# Patient Record
Sex: FEMALE | Race: WHITE | NOT HISPANIC OR LATINO
[De-identification: names, ages, dates, MRNs, and addresses within clinical notes are randomized per-mention and may not be internally consistent; named-entity substitution may affect disease eponyms.]

---

## 2018-11-20 ENCOUNTER — TRANSCRIPTION ENCOUNTER (OUTPATIENT)
Age: 78
End: 2018-11-20

## 2018-11-23 ENCOUNTER — EMERGENCY (EMERGENCY)
Facility: HOSPITAL | Age: 78
LOS: 0 days | Discharge: HOME | End: 2018-11-23
Attending: STUDENT IN AN ORGANIZED HEALTH CARE EDUCATION/TRAINING PROGRAM | Admitting: STUDENT IN AN ORGANIZED HEALTH CARE EDUCATION/TRAINING PROGRAM
Payer: MEDICARE

## 2018-11-23 VITALS
SYSTOLIC BLOOD PRESSURE: 132 MMHG | OXYGEN SATURATION: 99 % | TEMPERATURE: 98 F | RESPIRATION RATE: 17 BRPM | HEART RATE: 66 BPM | DIASTOLIC BLOOD PRESSURE: 74 MMHG

## 2018-11-23 VITALS
TEMPERATURE: 98 F | HEIGHT: 62 IN | HEART RATE: 72 BPM | DIASTOLIC BLOOD PRESSURE: 62 MMHG | RESPIRATION RATE: 18 BRPM | WEIGHT: 134.92 LBS | OXYGEN SATURATION: 99 % | SYSTOLIC BLOOD PRESSURE: 148 MMHG

## 2018-11-23 DIAGNOSIS — M54.41 LUMBAGO WITH SCIATICA, RIGHT SIDE: ICD-10-CM

## 2018-11-23 DIAGNOSIS — M54.5 LOW BACK PAIN: ICD-10-CM

## 2018-11-23 LAB
ANION GAP SERPL CALC-SCNC: 9 MMOL/L — SIGNIFICANT CHANGE UP (ref 5–17)
BASOPHILS # BLD AUTO: 0.04 K/UL — SIGNIFICANT CHANGE UP (ref 0–0.2)
BASOPHILS NFR BLD AUTO: 0.5 % — SIGNIFICANT CHANGE UP (ref 0–2)
BUN SERPL-MCNC: 24 MG/DL — HIGH (ref 7–23)
CALCIUM SERPL-MCNC: 8.9 MG/DL — SIGNIFICANT CHANGE UP (ref 8.5–10.1)
CHLORIDE SERPL-SCNC: 107 MMOL/L — SIGNIFICANT CHANGE UP (ref 96–108)
CO2 SERPL-SCNC: 24 MMOL/L — SIGNIFICANT CHANGE UP (ref 22–31)
CREAT SERPL-MCNC: 0.73 MG/DL — SIGNIFICANT CHANGE UP (ref 0.5–1.3)
EOSINOPHIL # BLD AUTO: 0.13 K/UL — SIGNIFICANT CHANGE UP (ref 0–0.5)
EOSINOPHIL NFR BLD AUTO: 1.6 % — SIGNIFICANT CHANGE UP (ref 0–6)
GLUCOSE SERPL-MCNC: 98 MG/DL — SIGNIFICANT CHANGE UP (ref 70–99)
HCT VFR BLD CALC: 35.9 % — SIGNIFICANT CHANGE UP (ref 34.5–45)
HGB BLD-MCNC: 11.9 G/DL — SIGNIFICANT CHANGE UP (ref 11.5–15.5)
IMM GRANULOCYTES NFR BLD AUTO: 0.2 % — SIGNIFICANT CHANGE UP (ref 0–1.5)
LYMPHOCYTES # BLD AUTO: 1.36 K/UL — SIGNIFICANT CHANGE UP (ref 1–3.3)
LYMPHOCYTES # BLD AUTO: 16.6 % — SIGNIFICANT CHANGE UP (ref 13–44)
MCHC RBC-ENTMCNC: 29 PG — SIGNIFICANT CHANGE UP (ref 27–34)
MCHC RBC-ENTMCNC: 33.1 GM/DL — SIGNIFICANT CHANGE UP (ref 32–36)
MCV RBC AUTO: 87.6 FL — SIGNIFICANT CHANGE UP (ref 80–100)
MONOCYTES # BLD AUTO: 0.25 K/UL — SIGNIFICANT CHANGE UP (ref 0–0.9)
MONOCYTES NFR BLD AUTO: 3 % — SIGNIFICANT CHANGE UP (ref 2–14)
NEUTROPHILS # BLD AUTO: 6.41 K/UL — SIGNIFICANT CHANGE UP (ref 1.8–7.4)
NEUTROPHILS NFR BLD AUTO: 78.1 % — HIGH (ref 43–77)
NRBC # BLD: 0 /100 WBCS — SIGNIFICANT CHANGE UP (ref 0–0)
PLATELET # BLD AUTO: 173 K/UL — SIGNIFICANT CHANGE UP (ref 150–400)
POTASSIUM SERPL-MCNC: 3.7 MMOL/L — SIGNIFICANT CHANGE UP (ref 3.5–5.3)
POTASSIUM SERPL-SCNC: 3.7 MMOL/L — SIGNIFICANT CHANGE UP (ref 3.5–5.3)
RBC # BLD: 4.1 M/UL — SIGNIFICANT CHANGE UP (ref 3.8–5.2)
RBC # FLD: 13 % — SIGNIFICANT CHANGE UP (ref 10.3–14.5)
SODIUM SERPL-SCNC: 140 MMOL/L — SIGNIFICANT CHANGE UP (ref 135–145)
WBC # BLD: 8.21 K/UL — SIGNIFICANT CHANGE UP (ref 3.8–10.5)
WBC # FLD AUTO: 8.21 K/UL — SIGNIFICANT CHANGE UP (ref 3.8–10.5)

## 2018-11-23 PROCEDURE — 99284 EMERGENCY DEPT VISIT MOD MDM: CPT

## 2018-11-23 RX ORDER — CYCLOBENZAPRINE HYDROCHLORIDE 10 MG/1
1 TABLET, FILM COATED ORAL
Qty: 21 | Refills: 0
Start: 2018-11-23 | End: 2018-11-29

## 2018-11-23 RX ORDER — KETOROLAC TROMETHAMINE 30 MG/ML
30 SYRINGE (ML) INJECTION ONCE
Qty: 0 | Refills: 0 | Status: DISCONTINUED | OUTPATIENT
Start: 2018-11-23 | End: 2018-11-23

## 2018-11-23 RX ORDER — OXYCODONE AND ACETAMINOPHEN 5; 325 MG/1; MG/1
1 TABLET ORAL
Qty: 12 | Refills: 0
Start: 2018-11-23 | End: 2018-11-25

## 2018-11-23 RX ORDER — DEXAMETHASONE 0.5 MG/5ML
10 ELIXIR ORAL ONCE
Qty: 0 | Refills: 0 | Status: COMPLETED | OUTPATIENT
Start: 2018-11-23 | End: 2018-11-23

## 2018-11-23 RX ORDER — MORPHINE SULFATE 50 MG/1
4 CAPSULE, EXTENDED RELEASE ORAL ONCE
Qty: 0 | Refills: 0 | Status: DISCONTINUED | OUTPATIENT
Start: 2018-11-23 | End: 2018-11-23

## 2018-11-23 RX ORDER — DIAZEPAM 5 MG
5 TABLET ORAL ONCE
Qty: 0 | Refills: 0 | Status: DISCONTINUED | OUTPATIENT
Start: 2018-11-23 | End: 2018-11-23

## 2018-11-23 RX ORDER — OXYCODONE AND ACETAMINOPHEN 5; 325 MG/1; MG/1
1 TABLET ORAL ONCE
Qty: 0 | Refills: 0 | Status: DISCONTINUED | OUTPATIENT
Start: 2018-11-23 | End: 2018-11-23

## 2018-11-23 RX ORDER — LIDOCAINE 4 G/100G
1 CREAM TOPICAL ONCE
Qty: 0 | Refills: 0 | Status: COMPLETED | OUTPATIENT
Start: 2018-11-23 | End: 2018-11-23

## 2018-11-23 RX ADMIN — MORPHINE SULFATE 4 MILLIGRAM(S): 50 CAPSULE, EXTENDED RELEASE ORAL at 17:52

## 2018-11-23 RX ADMIN — LIDOCAINE 1 PATCH: 4 CREAM TOPICAL at 17:52

## 2018-11-23 RX ADMIN — MORPHINE SULFATE 4 MILLIGRAM(S): 50 CAPSULE, EXTENDED RELEASE ORAL at 17:34

## 2018-11-23 RX ADMIN — LIDOCAINE 1 PATCH: 4 CREAM TOPICAL at 17:34

## 2018-11-23 RX ADMIN — Medication 10 MILLIGRAM(S): at 15:07

## 2018-11-23 RX ADMIN — OXYCODONE AND ACETAMINOPHEN 1 TABLET(S): 5; 325 TABLET ORAL at 17:07

## 2018-11-23 RX ADMIN — OXYCODONE AND ACETAMINOPHEN 1 TABLET(S): 5; 325 TABLET ORAL at 15:54

## 2018-11-23 RX ADMIN — Medication 5 MILLIGRAM(S): at 15:07

## 2018-11-23 RX ADMIN — Medication 30 MILLIGRAM(S): at 15:40

## 2018-11-23 RX ADMIN — Medication 30 MILLIGRAM(S): at 15:07

## 2018-11-23 NOTE — ED ADULT TRIAGE NOTE - CHIEF COMPLAINT QUOTE
Patient presents with back pain, states she twisted in the shower a few weeks back and felt pain, went to urgent care and received tramadol which did not help. Difficulty walking

## 2018-11-23 NOTE — ED PROVIDER NOTE - OBJECTIVE STATEMENT
77 y/o female with no relevant PMHx presents to the ED c/o right lower back pain x1 week. Initial onset of pain was when she twisted in the shower and has been constant since. Pt seen at urgent care and had x-rays showing no fracture, but + arthritis. Yesterday pt's back pain worsened and is now unable to ambulate due to pain. Back pain radiates down RLE. Taking Ibuprofen, Flexeril and lidocaine patch without relief.  No numbness, tingling, incontinence, abd pain. 79 y/o female with no relevant PMHx presents to the ED c/o right lower back pain x1 week. Initial onset of pain was when she twisted in the shower and has been constant since. Pt seen at urgent care and had x-rays showing no fracture, but + arthritis. Yesterday pt's back pain worsened and now with worsening pain and difficulty ambulating;  Back pain radiates down RLE. Taking Ibuprofen, Flexeril and lidocaine patch without relief.  No numbness, tingling, incontinence, abd pain.

## 2018-11-23 NOTE — ED PROVIDER NOTE - PROGRESS NOTE DETAILS
79 y/o F presents with low back pain x 2 weeks. Pt reports R sided low back pain moderate in intensity radiating down her R leg that started while picking up soap in the shower. pt states pain was starting to improve but she turned abruptly yesterday reinjuring her back. Minimal relief with ibuprofen at home.   back: No rashes. No midline tenderness. TTP to R SI area. +R SLR. 2+ DTRs. 5/5 LE muscle str.  -Claudy Gold PA-C Pt reports improvement of pain, able to amubulate. Will dc with pain control, SW given. Discussed importance of close FU with PMD.  Pt asked to return to ED immediately for any new or concerning sx or worsening sx. Pt acknowledges and understands plan. -Claudy Gold PA-C Charlie DO: Offered admission prior to discharge as patient with ?still pain but feels better at this time and wanting to go home; no red flags; able to ambulate with cane; f/u with pmd/spine in 1-2 days without fail; strict return precautions given.

## 2018-11-23 NOTE — ED ADULT NURSE NOTE - OBJECTIVE STATEMENT
Pt is a 78y female, A & O x 3, VSS, BIBA, states worsening back pain and difficulty ambulating x 1.5 weeks, was seen at urgent care, given several medications with no relief, yesterday pt states she further aggravated her back standing up and heard a "pop" sound, pain radiates down right leg. + pulse, motor & sensation.

## 2018-11-23 NOTE — ED PROVIDER NOTE - MUSCULOSKELETAL, MLM
Spine appears normal, range of motion is not limited. +right sided paravertebral tenderness. No midline tenderness.

## 2018-11-23 NOTE — ED PROVIDER NOTE - ATTENDING CONTRIBUTION TO CARE
I, Rebeka Guerra DO,  performed the initial face to face bedside interview with this patient regarding history of present illness, review of symptoms and relevant past medical, social and family history.  I completed an independent physical examination.  I was the initial provider who evaluated this patient. I have signed out the follow up of any pending tests (i.e. labs, radiological studies) to the ACP.  I have communicated the patient’s plan of care and disposition with the ACP.  The history, relevant review of systems, past medical and surgical history, medical decision making, and physical examination was documented by the scribe in my presence and I attest to the accuracy of the documentation.

## 2020-11-27 ENCOUNTER — OUTPATIENT (OUTPATIENT)
Dept: OUTPATIENT SERVICES | Facility: HOSPITAL | Age: 80
LOS: 1 days | End: 2020-11-27
Payer: MEDICARE

## 2020-11-27 DIAGNOSIS — Z11.59 ENCOUNTER FOR SCREENING FOR OTHER VIRAL DISEASES: ICD-10-CM

## 2020-11-27 LAB — SARS-COV-2 RNA SPEC QL NAA+PROBE: SIGNIFICANT CHANGE UP

## 2020-11-27 PROCEDURE — U0003: CPT

## 2020-11-28 DIAGNOSIS — Z11.59 ENCOUNTER FOR SCREENING FOR OTHER VIRAL DISEASES: ICD-10-CM

## 2021-02-17 NOTE — ED ADULT NURSE NOTE - NS ED NURSE DC INFO COMPLEXITY
Patient scheduled for excision of mass on lateral right arm on 2/12/21 with Dr. Shukla.  Precert authorized by [facility], authorization # [id number] by Gigi Hendricks from 2/8/21 - 2/12/21   Simple: Patient demonstrates quick and easy understanding/Returned Demonstration/Verbalized Understanding

## 2023-07-12 ENCOUNTER — INPATIENT (INPATIENT)
Facility: HOSPITAL | Age: 83
LOS: 2 days | Discharge: HOME | DRG: 286 | End: 2023-07-15
Attending: INTERNAL MEDICINE | Admitting: INTERNAL MEDICINE
Payer: MEDICARE

## 2023-07-12 VITALS
DIASTOLIC BLOOD PRESSURE: 69 MMHG | TEMPERATURE: 98 F | WEIGHT: 128.09 LBS | HEART RATE: 87 BPM | OXYGEN SATURATION: 93 % | RESPIRATION RATE: 18 BRPM | SYSTOLIC BLOOD PRESSURE: 143 MMHG

## 2023-07-12 DIAGNOSIS — I44.7 LEFT BUNDLE-BRANCH BLOCK, UNSPECIFIED: ICD-10-CM

## 2023-07-12 DIAGNOSIS — I45.81 LONG QT SYNDROME: ICD-10-CM

## 2023-07-12 DIAGNOSIS — I25.118 ATHEROSCLEROTIC HEART DISEASE OF NATIVE CORONARY ARTERY WITH OTHER FORMS OF ANGINA PECTORIS: ICD-10-CM

## 2023-07-12 DIAGNOSIS — I24.8 OTHER FORMS OF ACUTE ISCHEMIC HEART DISEASE: ICD-10-CM

## 2023-07-12 DIAGNOSIS — Z86.16 PERSONAL HISTORY OF COVID-19: ICD-10-CM

## 2023-07-12 DIAGNOSIS — E78.5 HYPERLIPIDEMIA, UNSPECIFIED: ICD-10-CM

## 2023-07-12 DIAGNOSIS — E88.09 OTHER DISORDERS OF PLASMA-PROTEIN METABOLISM, NOT ELSEWHERE CLASSIFIED: ICD-10-CM

## 2023-07-12 DIAGNOSIS — I65.21 OCCLUSION AND STENOSIS OF RIGHT CAROTID ARTERY: ICD-10-CM

## 2023-07-12 DIAGNOSIS — G47.00 INSOMNIA, UNSPECIFIED: ICD-10-CM

## 2023-07-12 DIAGNOSIS — Z90.49 ACQUIRED ABSENCE OF OTHER SPECIFIED PARTS OF DIGESTIVE TRACT: ICD-10-CM

## 2023-07-12 DIAGNOSIS — E43 UNSPECIFIED SEVERE PROTEIN-CALORIE MALNUTRITION: ICD-10-CM

## 2023-07-12 DIAGNOSIS — I50.23 ACUTE ON CHRONIC SYSTOLIC (CONGESTIVE) HEART FAILURE: ICD-10-CM

## 2023-07-12 DIAGNOSIS — R06.02 SHORTNESS OF BREATH: ICD-10-CM

## 2023-07-12 DIAGNOSIS — E87.6 HYPOKALEMIA: ICD-10-CM

## 2023-07-12 DIAGNOSIS — I42.8 OTHER CARDIOMYOPATHIES: ICD-10-CM

## 2023-07-12 DIAGNOSIS — I11.0 HYPERTENSIVE HEART DISEASE WITH HEART FAILURE: ICD-10-CM

## 2023-07-12 DIAGNOSIS — R91.8 OTHER NONSPECIFIC ABNORMAL FINDING OF LUNG FIELD: ICD-10-CM

## 2023-07-12 LAB
ALBUMIN SERPL ELPH-MCNC: 3 G/DL — LOW (ref 3.3–5)
ALP SERPL-CCNC: 102 U/L — SIGNIFICANT CHANGE UP (ref 40–120)
ALT FLD-CCNC: 12 U/L — SIGNIFICANT CHANGE UP (ref 12–78)
ANION GAP SERPL CALC-SCNC: 4 MMOL/L — LOW (ref 5–17)
APTT BLD: 28.2 SEC — SIGNIFICANT CHANGE UP (ref 27.5–35.5)
AST SERPL-CCNC: 8 U/L — LOW (ref 15–37)
BASOPHILS # BLD AUTO: 0.03 K/UL — SIGNIFICANT CHANGE UP (ref 0–0.2)
BASOPHILS NFR BLD AUTO: 0.4 % — SIGNIFICANT CHANGE UP (ref 0–2)
BILIRUB SERPL-MCNC: 0.5 MG/DL — SIGNIFICANT CHANGE UP (ref 0.2–1.2)
BUN SERPL-MCNC: 15 MG/DL — SIGNIFICANT CHANGE UP (ref 7–23)
CALCIUM SERPL-MCNC: 8.8 MG/DL — SIGNIFICANT CHANGE UP (ref 8.5–10.1)
CHLORIDE SERPL-SCNC: 113 MMOL/L — HIGH (ref 96–108)
CO2 SERPL-SCNC: 29 MMOL/L — SIGNIFICANT CHANGE UP (ref 22–31)
CREAT SERPL-MCNC: 0.56 MG/DL — SIGNIFICANT CHANGE UP (ref 0.5–1.3)
D DIMER BLD IA.RAPID-MCNC: 239 NG/ML DDU — HIGH
EGFR: 90 ML/MIN/1.73M2 — SIGNIFICANT CHANGE UP
EOSINOPHIL # BLD AUTO: 0.19 K/UL — SIGNIFICANT CHANGE UP (ref 0–0.5)
EOSINOPHIL NFR BLD AUTO: 2.4 % — SIGNIFICANT CHANGE UP (ref 0–6)
GLUCOSE SERPL-MCNC: 99 MG/DL — SIGNIFICANT CHANGE UP (ref 70–99)
HCT VFR BLD CALC: 34.9 % — SIGNIFICANT CHANGE UP (ref 34.5–45)
HGB BLD-MCNC: 11.5 G/DL — SIGNIFICANT CHANGE UP (ref 11.5–15.5)
IMM GRANULOCYTES NFR BLD AUTO: 0.2 % — SIGNIFICANT CHANGE UP (ref 0–0.9)
INR BLD: 1.1 RATIO — SIGNIFICANT CHANGE UP (ref 0.88–1.16)
LYMPHOCYTES # BLD AUTO: 2.02 K/UL — SIGNIFICANT CHANGE UP (ref 1–3.3)
LYMPHOCYTES # BLD AUTO: 25.1 % — SIGNIFICANT CHANGE UP (ref 13–44)
MCHC RBC-ENTMCNC: 29.2 PG — SIGNIFICANT CHANGE UP (ref 27–34)
MCHC RBC-ENTMCNC: 33 GM/DL — SIGNIFICANT CHANGE UP (ref 32–36)
MCV RBC AUTO: 88.6 FL — SIGNIFICANT CHANGE UP (ref 80–100)
MONOCYTES # BLD AUTO: 0.63 K/UL — SIGNIFICANT CHANGE UP (ref 0–0.9)
MONOCYTES NFR BLD AUTO: 7.8 % — SIGNIFICANT CHANGE UP (ref 2–14)
NEUTROPHILS # BLD AUTO: 5.15 K/UL — SIGNIFICANT CHANGE UP (ref 1.8–7.4)
NEUTROPHILS NFR BLD AUTO: 64.1 % — SIGNIFICANT CHANGE UP (ref 43–77)
NT-PROBNP SERPL-SCNC: 3224 PG/ML — HIGH (ref 0–450)
PLATELET # BLD AUTO: 137 K/UL — LOW (ref 150–400)
POTASSIUM SERPL-MCNC: 3.4 MMOL/L — LOW (ref 3.5–5.3)
POTASSIUM SERPL-SCNC: 3.4 MMOL/L — LOW (ref 3.5–5.3)
PROT SERPL-MCNC: 6.1 GM/DL — SIGNIFICANT CHANGE UP (ref 6–8.3)
PROTHROM AB SERPL-ACNC: 12.8 SEC — SIGNIFICANT CHANGE UP (ref 10.5–13.4)
RBC # BLD: 3.94 M/UL — SIGNIFICANT CHANGE UP (ref 3.8–5.2)
RBC # FLD: 13.1 % — SIGNIFICANT CHANGE UP (ref 10.3–14.5)
SODIUM SERPL-SCNC: 146 MMOL/L — HIGH (ref 135–145)
TROPONIN I, HIGH SENSITIVITY RESULT: 23.39 NG/L — SIGNIFICANT CHANGE UP
TROPONIN I, HIGH SENSITIVITY RESULT: 83.83 NG/L — HIGH
WBC # BLD: 8.04 K/UL — SIGNIFICANT CHANGE UP (ref 3.8–10.5)
WBC # FLD AUTO: 8.04 K/UL — SIGNIFICANT CHANGE UP (ref 3.8–10.5)

## 2023-07-12 PROCEDURE — C1894: CPT

## 2023-07-12 PROCEDURE — 99285 EMERGENCY DEPT VISIT HI MDM: CPT

## 2023-07-12 PROCEDURE — 84484 ASSAY OF TROPONIN QUANT: CPT

## 2023-07-12 PROCEDURE — 97116 GAIT TRAINING THERAPY: CPT | Mod: GP

## 2023-07-12 PROCEDURE — 71275 CT ANGIOGRAPHY CHEST: CPT | Mod: 26,MA

## 2023-07-12 PROCEDURE — C1769: CPT

## 2023-07-12 PROCEDURE — 71045 X-RAY EXAM CHEST 1 VIEW: CPT | Mod: 26

## 2023-07-12 PROCEDURE — 93306 TTE W/DOPPLER COMPLETE: CPT

## 2023-07-12 PROCEDURE — 85025 COMPLETE CBC W/AUTO DIFF WBC: CPT

## 2023-07-12 PROCEDURE — 36415 COLL VENOUS BLD VENIPUNCTURE: CPT

## 2023-07-12 PROCEDURE — 80061 LIPID PANEL: CPT

## 2023-07-12 PROCEDURE — 80048 BASIC METABOLIC PNL TOTAL CA: CPT

## 2023-07-12 PROCEDURE — 93005 ELECTROCARDIOGRAM TRACING: CPT

## 2023-07-12 PROCEDURE — 85610 PROTHROMBIN TIME: CPT

## 2023-07-12 PROCEDURE — 83735 ASSAY OF MAGNESIUM: CPT

## 2023-07-12 PROCEDURE — C1887: CPT

## 2023-07-12 PROCEDURE — 84100 ASSAY OF PHOSPHORUS: CPT

## 2023-07-12 PROCEDURE — 97162 PT EVAL MOD COMPLEX 30 MIN: CPT | Mod: GP

## 2023-07-12 PROCEDURE — 85027 COMPLETE CBC AUTOMATED: CPT

## 2023-07-12 PROCEDURE — 93880 EXTRACRANIAL BILAT STUDY: CPT

## 2023-07-12 PROCEDURE — 93454 CORONARY ARTERY ANGIO S&I: CPT

## 2023-07-12 PROCEDURE — 93010 ELECTROCARDIOGRAM REPORT: CPT

## 2023-07-12 RX ORDER — VITAMIN E 100 UNIT
1 CAPSULE ORAL
Refills: 0 | DISCHARGE

## 2023-07-12 RX ORDER — CHOLECALCIFEROL (VITAMIN D3) 125 MCG
1 CAPSULE ORAL
Refills: 0 | DISCHARGE

## 2023-07-12 RX ORDER — PREGABALIN 225 MG/1
1 CAPSULE ORAL
Refills: 0 | DISCHARGE

## 2023-07-12 RX ORDER — FUROSEMIDE 40 MG
20 TABLET ORAL DAILY
Refills: 0 | Status: DISCONTINUED | OUTPATIENT
Start: 2023-07-12 | End: 2023-07-13

## 2023-07-12 RX ADMIN — Medication 20 MILLIGRAM(S): at 20:10

## 2023-07-12 NOTE — ED ADULT NURSE NOTE - NSFALLHARMRISKINTERV_ED_ALL_ED

## 2023-07-12 NOTE — ED PROVIDER NOTE - OBJECTIVE STATEMENT
82 y/o w/ PMHx of cardiomyopathies and leaky valves BIBEMS from home, c/o shortness of breath and chest pressure after being in the pool today. Pt left the pool to use the bathroom when she reported SOB that progressively was getting worth. States she was not exerting herself but that it felt like gas pains. Symptoms have improved but still present, persistent dry cough. Denies CP, fevers, or palpitations. 84 y/o w/ PMHx of cardiomyopathy; ?leaky valve; BIBEMS from home, c/o shortness of breath after being in the pool today. Pt left the pool to use the bathroom when she reported SOB that progressively was getting worth. States she was not exerting herself; Symptoms have improved but still present, persistent dry cough. Denies CP, fevers, or palpitations.

## 2023-07-12 NOTE — ED PROVIDER NOTE - CLINICAL SUMMARY MEDICAL DECISION MAKING FREE TEXT BOX
pt w/ SOB episode, screening EKG, CXR, labs, R/E 82 y/o pt w/ SOB episode, screening EKG, CXR, labs, R/E 82 y/o pt w/ SOB episode, screening EKG, CXR, labs, Re-eval    CTA with evidence of pulmonary edema; lasix ordered; endorsed to Dr. Brar for admission. 84 y/o pt w/ SOB episode, screening EKG, CXR, labs, Re-eval;     EKG with new LBBB- no chest pain in ED; old reviewed from doctor's office and placed in chart; will admit for further work up.     CTA with evidence of pulmonary edema; lasix ordered; endorsed to Dr. Brar for admission.

## 2023-07-12 NOTE — ED ADULT TRIAGE NOTE - CHIEF COMPLAINT QUOTE
Pt BIBEMS from home, c/o shortness of breath and chest pressure after being in the pool today. States she was not exerting herself but that it felt like gas pains. Symptoms have improved but still present. SpO2 93% on RA in triage. HX of cardiomyopathies and leaky valves. Denies fevers. STAT EKG to be completed.

## 2023-07-12 NOTE — PHARMACOTHERAPY INTERVENTION NOTE - COMMENTS
Medication reconciliation completed.  Reviewed Medication list and confirmed med allergies with patient; confirmed with Dr. First Mederich.

## 2023-07-12 NOTE — ED ADULT NURSE NOTE - SKIN CAPILLARY REFILL
Called and gave results of OPO reviewed this morning scanned into media done on room air and looks good.  Order written to preferred home to discontinue oxygen and apnea monitor.  PRICE  
2 seconds or less

## 2023-07-13 DIAGNOSIS — Z98.49 CATARACT EXTRACTION STATUS, UNSPECIFIED EYE: Chronic | ICD-10-CM

## 2023-07-13 DIAGNOSIS — Z98.890 OTHER SPECIFIED POSTPROCEDURAL STATES: Chronic | ICD-10-CM

## 2023-07-13 DIAGNOSIS — Z90.49 ACQUIRED ABSENCE OF OTHER SPECIFIED PARTS OF DIGESTIVE TRACT: Chronic | ICD-10-CM

## 2023-07-13 LAB
ADD ON TEST-SPECIMEN IN LAB: SIGNIFICANT CHANGE UP
ANION GAP SERPL CALC-SCNC: 4 MMOL/L — LOW (ref 5–17)
BASOPHILS # BLD AUTO: 0.04 K/UL — SIGNIFICANT CHANGE UP (ref 0–0.2)
BASOPHILS NFR BLD AUTO: 0.6 % — SIGNIFICANT CHANGE UP (ref 0–2)
BUN SERPL-MCNC: 14 MG/DL — SIGNIFICANT CHANGE UP (ref 7–23)
CALCIUM SERPL-MCNC: 9.1 MG/DL — SIGNIFICANT CHANGE UP (ref 8.5–10.1)
CHLORIDE SERPL-SCNC: 110 MMOL/L — HIGH (ref 96–108)
CHOLEST SERPL-MCNC: 112 MG/DL — SIGNIFICANT CHANGE UP
CO2 SERPL-SCNC: 33 MMOL/L — HIGH (ref 22–31)
CREAT SERPL-MCNC: 0.61 MG/DL — SIGNIFICANT CHANGE UP (ref 0.5–1.3)
EGFR: 88 ML/MIN/1.73M2 — SIGNIFICANT CHANGE UP
EOSINOPHIL # BLD AUTO: 0.12 K/UL — SIGNIFICANT CHANGE UP (ref 0–0.5)
EOSINOPHIL NFR BLD AUTO: 1.8 % — SIGNIFICANT CHANGE UP (ref 0–6)
GLUCOSE SERPL-MCNC: 96 MG/DL — SIGNIFICANT CHANGE UP (ref 70–99)
HCT VFR BLD CALC: 36.8 % — SIGNIFICANT CHANGE UP (ref 34.5–45)
HDLC SERPL-MCNC: 45 MG/DL — LOW
HGB BLD-MCNC: 12.1 G/DL — SIGNIFICANT CHANGE UP (ref 11.5–15.5)
IMM GRANULOCYTES NFR BLD AUTO: 0.3 % — SIGNIFICANT CHANGE UP (ref 0–0.9)
INR BLD: 1.1 RATIO — SIGNIFICANT CHANGE UP (ref 0.88–1.16)
LIPID PNL WITH DIRECT LDL SERPL: 49 MG/DL — SIGNIFICANT CHANGE UP
LYMPHOCYTES # BLD AUTO: 2.02 K/UL — SIGNIFICANT CHANGE UP (ref 1–3.3)
LYMPHOCYTES # BLD AUTO: 29.9 % — SIGNIFICANT CHANGE UP (ref 13–44)
MAGNESIUM SERPL-MCNC: 2 MG/DL — SIGNIFICANT CHANGE UP (ref 1.6–2.6)
MCHC RBC-ENTMCNC: 29 PG — SIGNIFICANT CHANGE UP (ref 27–34)
MCHC RBC-ENTMCNC: 32.9 GM/DL — SIGNIFICANT CHANGE UP (ref 32–36)
MCV RBC AUTO: 88.2 FL — SIGNIFICANT CHANGE UP (ref 80–100)
MONOCYTES # BLD AUTO: 0.68 K/UL — SIGNIFICANT CHANGE UP (ref 0–0.9)
MONOCYTES NFR BLD AUTO: 10.1 % — SIGNIFICANT CHANGE UP (ref 2–14)
NEUTROPHILS # BLD AUTO: 3.87 K/UL — SIGNIFICANT CHANGE UP (ref 1.8–7.4)
NEUTROPHILS NFR BLD AUTO: 57.3 % — SIGNIFICANT CHANGE UP (ref 43–77)
NON HDL CHOLESTEROL: 67 MG/DL — SIGNIFICANT CHANGE UP
PHOSPHATE SERPL-MCNC: 3.3 MG/DL — SIGNIFICANT CHANGE UP (ref 2.5–4.5)
PLATELET # BLD AUTO: 129 K/UL — LOW (ref 150–400)
POTASSIUM SERPL-MCNC: 3.4 MMOL/L — LOW (ref 3.5–5.3)
POTASSIUM SERPL-SCNC: 3.4 MMOL/L — LOW (ref 3.5–5.3)
PROTHROM AB SERPL-ACNC: 12.8 SEC — SIGNIFICANT CHANGE UP (ref 10.5–13.4)
RBC # BLD: 4.17 M/UL — SIGNIFICANT CHANGE UP (ref 3.8–5.2)
RBC # FLD: 13.2 % — SIGNIFICANT CHANGE UP (ref 10.3–14.5)
SODIUM SERPL-SCNC: 147 MMOL/L — HIGH (ref 135–145)
TRIGL SERPL-MCNC: 97 MG/DL — SIGNIFICANT CHANGE UP
TROPONIN I, HIGH SENSITIVITY RESULT: 61.36 NG/L — HIGH
WBC # BLD: 6.75 K/UL — SIGNIFICANT CHANGE UP (ref 3.8–10.5)
WBC # FLD AUTO: 6.75 K/UL — SIGNIFICANT CHANGE UP (ref 3.8–10.5)

## 2023-07-13 PROCEDURE — 99497 ADVNCD CARE PLAN 30 MIN: CPT | Mod: 25

## 2023-07-13 PROCEDURE — 99223 1ST HOSP IP/OBS HIGH 75: CPT

## 2023-07-13 PROCEDURE — 12345: CPT | Mod: NC

## 2023-07-13 PROCEDURE — 93010 ELECTROCARDIOGRAM REPORT: CPT | Mod: 76

## 2023-07-13 PROCEDURE — 93880 EXTRACRANIAL BILAT STUDY: CPT | Mod: 26

## 2023-07-13 PROCEDURE — 93306 TTE W/DOPPLER COMPLETE: CPT | Mod: 26

## 2023-07-13 RX ORDER — POTASSIUM CHLORIDE 20 MEQ
40 PACKET (EA) ORAL ONCE
Refills: 0 | Status: COMPLETED | OUTPATIENT
Start: 2023-07-13 | End: 2023-07-13

## 2023-07-13 RX ORDER — LISINOPRIL 2.5 MG/1
40 TABLET ORAL DAILY
Refills: 0 | Status: DISCONTINUED | OUTPATIENT
Start: 2023-07-13 | End: 2023-07-13

## 2023-07-13 RX ORDER — ACETAMINOPHEN 500 MG
650 TABLET ORAL EVERY 6 HOURS
Refills: 0 | Status: DISCONTINUED | OUTPATIENT
Start: 2023-07-13 | End: 2023-07-15

## 2023-07-13 RX ORDER — PREGABALIN 225 MG/1
1000 CAPSULE ORAL DAILY
Refills: 0 | Status: DISCONTINUED | OUTPATIENT
Start: 2023-07-13 | End: 2023-07-15

## 2023-07-13 RX ORDER — POTASSIUM CHLORIDE 20 MEQ
20 PACKET (EA) ORAL ONCE
Refills: 0 | Status: COMPLETED | OUTPATIENT
Start: 2023-07-13 | End: 2023-07-13

## 2023-07-13 RX ORDER — FUROSEMIDE 40 MG
40 TABLET ORAL DAILY
Refills: 0 | Status: DISCONTINUED | OUTPATIENT
Start: 2023-07-13 | End: 2023-07-14

## 2023-07-13 RX ORDER — LACTOBACILLUS ACIDOPHILUS 100MM CELL
1 CAPSULE ORAL
Refills: 0 | Status: DISCONTINUED | OUTPATIENT
Start: 2023-07-13 | End: 2023-07-15

## 2023-07-13 RX ORDER — NITROGLYCERIN 6.5 MG
0.4 CAPSULE, EXTENDED RELEASE ORAL
Refills: 0 | Status: DISCONTINUED | OUTPATIENT
Start: 2023-07-13 | End: 2023-07-15

## 2023-07-13 RX ORDER — FUROSEMIDE 40 MG
20 TABLET ORAL ONCE
Refills: 0 | Status: COMPLETED | OUTPATIENT
Start: 2023-07-13 | End: 2023-07-13

## 2023-07-13 RX ORDER — LANOLIN ALCOHOL/MO/W.PET/CERES
3 CREAM (GRAM) TOPICAL AT BEDTIME
Refills: 0 | Status: DISCONTINUED | OUTPATIENT
Start: 2023-07-13 | End: 2023-07-15

## 2023-07-13 RX ORDER — ATORVASTATIN CALCIUM 80 MG/1
20 TABLET, FILM COATED ORAL AT BEDTIME
Refills: 0 | Status: DISCONTINUED | OUTPATIENT
Start: 2023-07-13 | End: 2023-07-13

## 2023-07-13 RX ORDER — ZINC SULFATE TAB 220 MG (50 MG ZINC EQUIVALENT) 220 (50 ZN) MG
220 TAB ORAL DAILY
Refills: 0 | Status: DISCONTINUED | OUTPATIENT
Start: 2023-07-13 | End: 2023-07-15

## 2023-07-13 RX ORDER — ASPIRIN/CALCIUM CARB/MAGNESIUM 324 MG
325 TABLET ORAL ONCE
Refills: 0 | Status: COMPLETED | OUTPATIENT
Start: 2023-07-13 | End: 2023-07-13

## 2023-07-13 RX ORDER — OMEGA-3 ACID ETHYL ESTERS 1 G
2 CAPSULE ORAL
Refills: 0 | Status: DISCONTINUED | OUTPATIENT
Start: 2023-07-13 | End: 2023-07-15

## 2023-07-13 RX ORDER — CLOPIDOGREL BISULFATE 75 MG/1
75 TABLET, FILM COATED ORAL DAILY
Refills: 0 | Status: DISCONTINUED | OUTPATIENT
Start: 2023-07-13 | End: 2023-07-15

## 2023-07-13 RX ORDER — ENOXAPARIN SODIUM 100 MG/ML
40 INJECTION SUBCUTANEOUS EVERY 24 HOURS
Refills: 0 | Status: DISCONTINUED | OUTPATIENT
Start: 2023-07-13 | End: 2023-07-15

## 2023-07-13 RX ORDER — ASPIRIN/CALCIUM CARB/MAGNESIUM 324 MG
81 TABLET ORAL DAILY
Refills: 0 | Status: DISCONTINUED | OUTPATIENT
Start: 2023-07-14 | End: 2023-07-15

## 2023-07-13 RX ORDER — VITAMIN E 100 UNIT
400 CAPSULE ORAL DAILY
Refills: 0 | Status: DISCONTINUED | OUTPATIENT
Start: 2023-07-13 | End: 2023-07-15

## 2023-07-13 RX ORDER — ATORVASTATIN CALCIUM 80 MG/1
40 TABLET, FILM COATED ORAL AT BEDTIME
Refills: 0 | Status: DISCONTINUED | OUTPATIENT
Start: 2023-07-13 | End: 2023-07-15

## 2023-07-13 RX ORDER — CARVEDILOL PHOSPHATE 80 MG/1
12.5 CAPSULE, EXTENDED RELEASE ORAL EVERY 12 HOURS
Refills: 0 | Status: DISCONTINUED | OUTPATIENT
Start: 2023-07-13 | End: 2023-07-14

## 2023-07-13 RX ORDER — CHOLECALCIFEROL (VITAMIN D3) 125 MCG
1000 CAPSULE ORAL DAILY
Refills: 0 | Status: DISCONTINUED | OUTPATIENT
Start: 2023-07-13 | End: 2023-07-15

## 2023-07-13 RX ORDER — ISOSORBIDE MONONITRATE 60 MG/1
15 TABLET, EXTENDED RELEASE ORAL DAILY
Refills: 0 | Status: DISCONTINUED | OUTPATIENT
Start: 2023-07-13 | End: 2023-07-13

## 2023-07-13 RX ADMIN — Medication 400 INTERNATIONAL UNIT(S): at 10:56

## 2023-07-13 RX ADMIN — Medication 1 TABLET(S): at 10:57

## 2023-07-13 RX ADMIN — PREGABALIN 1000 MICROGRAM(S): 225 CAPSULE ORAL at 10:56

## 2023-07-13 RX ADMIN — Medication 2 GRAM(S): at 20:47

## 2023-07-13 RX ADMIN — CARVEDILOL PHOSPHATE 12.5 MILLIGRAM(S): 80 CAPSULE, EXTENDED RELEASE ORAL at 17:58

## 2023-07-13 RX ADMIN — Medication 2 GRAM(S): at 10:56

## 2023-07-13 RX ADMIN — ZINC SULFATE TAB 220 MG (50 MG ZINC EQUIVALENT) 220 MILLIGRAM(S): 220 (50 ZN) TAB at 10:56

## 2023-07-13 RX ADMIN — ATORVASTATIN CALCIUM 40 MILLIGRAM(S): 80 TABLET, FILM COATED ORAL at 20:48

## 2023-07-13 RX ADMIN — Medication 325 MILLIGRAM(S): at 05:05

## 2023-07-13 RX ADMIN — Medication 40 MILLIGRAM(S): at 10:55

## 2023-07-13 RX ADMIN — Medication 40 MILLIEQUIVALENT(S): at 10:57

## 2023-07-13 RX ADMIN — CLOPIDOGREL BISULFATE 75 MILLIGRAM(S): 75 TABLET, FILM COATED ORAL at 12:54

## 2023-07-13 RX ADMIN — Medication 1000 UNIT(S): at 10:57

## 2023-07-13 RX ADMIN — Medication 20 MILLIGRAM(S): at 05:05

## 2023-07-13 RX ADMIN — Medication 1 TABLET(S): at 10:56

## 2023-07-13 RX ADMIN — ENOXAPARIN SODIUM 40 MILLIGRAM(S): 100 INJECTION SUBCUTANEOUS at 10:56

## 2023-07-13 RX ADMIN — CARVEDILOL PHOSPHATE 12.5 MILLIGRAM(S): 80 CAPSULE, EXTENDED RELEASE ORAL at 05:05

## 2023-07-13 NOTE — PHYSICAL THERAPY INITIAL EVALUATION ADULT - MODALITIES TREATMENT COMMENTS
pt states she has never had an episode like this before ,felt like she had something sitting on her chest and couldn't recover breath and told grandson to call 911

## 2023-07-13 NOTE — DIETITIAN INITIAL EVALUATION ADULT - ORAL INTAKE PTA/DIET HISTORY
Pt shops and cooks for self, eats 2-3 meals a day.  Light breakfast and lunch, heavier dinner.  Based on dietary recall, PO intake estimated < 75% ENN > one month.

## 2023-07-13 NOTE — PATIENT PROFILE ADULT - FALL HARM RISK - HARM RISK INTERVENTIONS

## 2023-07-13 NOTE — PHYSICAL THERAPY INITIAL EVALUATION ADULT - DIAGNOSIS, PT EVAL
acute on chronic systolic heart failure mid chest pain/heaviness ,+acute on chronic systolic heart failure, mildly elevated Troponin I now downtrending,

## 2023-07-13 NOTE — DIETITIAN INITIAL EVALUATION ADULT - NSICDXPASTMEDICALHX_GEN_ALL_CORE_FT
PAST MEDICAL HISTORY:  2019 novel coronavirus disease (COVID-19)     H/O cardiomyopathy     H/O carotid artery stenosis     History of heart valve abnormality     History of IBS     HLD (hyperlipidemia)     [place] (hard of hearing)     HTN (hypertension)

## 2023-07-13 NOTE — PHYSICAL THERAPY INITIAL EVALUATION ADULT - GENERAL OBSERVATIONS, REHAB EVAL
pt sitting up at EOB finishing lunch meal with good appetite, reports pain-free today/chest pain resolved with diuresis ,but toileting frequently , pt had Chinese food this past week

## 2023-07-13 NOTE — CONSULT NOTE ADULT - SUBJECTIVE AND OBJECTIVE BOX
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 Pt states that she got out of the pool to go to the bathroom. She felt a heaviness of her chest and was unable to catch her breath. The chest heaviness was located in the middle of her chest without radiation. She has been getting SOB on exertion recently mostly when she walks up 2 flights of stairs. Uses 1 pillow to sleep at night. Does not lay flat on her back. Reports having runny nose, cough, and swelling of her feet. Last ECHO was 1 year ago. She was following with her cardiologist for years but he no longer takes her insurance and she has not been for any recent testing. 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 Denies headaches, blurry vision, dizziness, fevers, chills, abdominal pain, N/V, diarrhea/constipation.      ER course: SpO2 93-97% on room air. Labs: , D-dimer 239, troponin 23.39 -> 83.83. Na 146, K+ 3.4, albumin 3.0, BNP 3224. 1st EKG: NSR with PVCs HR 85 bpm, normal intervals, , LBBB (pt is unsure if this is old or new), does not meet Sgarbossa criteria, no ST changes (personally reviewed).  2nd EKG: unchanged from prior -> NSR with frequent PVCs HR 81 bpm,  ms, does not meet Sgarbossa criteria, LBBB, trigeminy (personally reviewed).     Imaging:   - CXR: increased vascular congestion bilaterally, no consolidation, no pneumothorax, no effusion (personally reviewed).   - CTA: No pulmonary embolus. Findings of pulmonary edema including interlobular septal thickening, groundglass, and trace pleural effusions. Follow to resolution with repeat chest CT in one month, or sooner as clinically warranted. Additional findings suggestive of cardiac dysfunction. Recommend echocardiogram. Coronary artery calcifications. Mild emphysematous changes. Scattered sub-4 mm pulmonary nodules and numerous nonspecific small mediastinal nodes up to 9 mm size can be reevaluated on subsequent chest CT.    Pt was given 20 mg IVP lasix. She is being admitted to telemetry for further management.    (13 Jul 2023 01:02)    83y Female    PAST MEDICAL & SURGICAL HISTORY:  2019 novel coronavirus disease (COVID-19)      History of IBS      H/O cardiomyopathy      History of heart valve abnormality      HTN (hypertension)      HLD (hyperlipidemia)      [place] (hard of hearing)      H/O carotid artery stenosis      S/P cataract surgery      S/P cholecystectomy      H/O knee surgery          MEDICATIONS  (STANDING):  atorvastatin 40 milliGRAM(s) Oral at bedtime  carvedilol 12.5 milliGRAM(s) Oral every 12 hours  cholecalciferol 1000 Unit(s) Oral daily  clopidogrel Tablet 75 milliGRAM(s) Oral daily  cyanocobalamin 1000 MICROGram(s) Oral daily  enoxaparin Injectable 40 milliGRAM(s) SubCutaneous every 24 hours  furosemide   Injectable 40 milliGRAM(s) IV Push daily  lactobacillus acidophilus 1 Tablet(s) Oral two times a day with meals  multivitamin 1 Tablet(s) Oral daily  omega-3-Acid Ethyl Esters 2 Gram(s) Oral two times a day  potassium chloride    Tablet ER 20 milliEquivalent(s) Oral once  vitamin E 400 International Unit(s) Oral daily  zinc sulfate 220 milliGRAM(s) Oral daily    MEDICATIONS  (PRN):  acetaminophen     Tablet .. 650 milliGRAM(s) Oral every 6 hours PRN Temp greater or equal to 38C (100.4F), Mild Pain (1 - 3)  melatonin 3 milliGRAM(s) Oral at bedtime PRN Insomnia  nitroglycerin     SubLingual 0.4 milliGRAM(s) SubLingual every 5 minutes PRN Chest Pain      Allergies    ondansetron (Unknown)  strawberry (Unknown)  sulfa drugs (Unknown)  codeine (Unknown)    Intolerances        SOCIAL HISTORY: Denies tobacco, etoh abuse or illicit drug use    FAMILY HISTORY:  Family history of CVA (Father)        Vital Signs Last 24 Hrs  T(C): 36.4 (13 Jul 2023 08:33), Max: 37 (12 Jul 2023 21:25)  T(F): 97.6 (13 Jul 2023 08:33), Max: 98.6 (12 Jul 2023 21:25)  HR: 77 (13 Jul 2023 08:33) (77 - 88)  BP: 145/79 (13 Jul 2023 08:33) (115/57 - 145/79)  BP(mean): --  RR: 18 (13 Jul 2023 08:33) (18 - 18)  SpO2: 97% (13 Jul 2023 08:33) (93% - 99%)    Parameters below as of 13 Jul 2023 08:33  Patient On (Oxygen Delivery Method): room air        REVIEW OF SYSTEMS:    CONSTITUTIONAL:  As per HPI.  HEENT:  Eyes:  No diplopia or blurred vision. ENT:  No earache, sore throat or runny nose.  CARDIOVASCULAR:  No pressure, squeezing, strangling, tightness, heaviness or aching about the chest, neck, axilla or epigastrium.  RESPIRATORY:  No cough, shortness of breath, PND or orthopnea.  GASTROINTESTINAL:  No nausea, vomiting or diarrhea.  GENITOURINARY:  No dysuria, frequency or urgency.  MUSCULOSKELETAL:  As per HPI.  SKIN:  No change in skin, hair or nails.  NEUROLOGIC:  No paresthesias, fasciculations, seizures or weakness.  PSYCHIATRIC:  No disorder of thought or mood.  ENDOCRINE:  No heat or cold intolerance, polyuria or polydipsia.  HEMATOLOGICAL:  No easy bruising or bleedings:  .     PHYSICAL EXAMINATION:    GENERAL APPEARANCE:  Pt. is not currently dyspneic, in no distress. Pt. is alert, oriented, and pleasant.  HEENT:  Pupils are normal and react normally. No icterus. Mucous membranes well colored.  NECK:  Supple. No lymphadenopathy. Jugular venous pressure not elevated. Carotids equal.   HEART:   The cardiac impulse has a normal quality. There are no murmurs, rubs or gallops noted  CHEST:  Chest is clear to auscultation. Normal respiratory effort.  ABDOMEN:  Soft and nontender.   EXTREMITIES:  There is no edema.   SKIN:  No rash or significant lesions are noted.    I&O's Summary      LABS:                        12.1   6.75  )-----------( 129      ( 13 Jul 2023 07:02 )             36.8     07-13    147<H>  |  110<H>  |  14  ----------------------------<  96  3.4<L>   |  33<H>  |  0.61    Ca    9.1      13 Jul 2023 07:02  Phos  3.3     07-13  Mg     2.0     07-13    TPro  6.1  /  Alb  3.0<L>  /  TBili  0.5  /  DBili  x   /  AST  8<L>  /  ALT  12  /  AlkPhos  102  07-12    LIVER FUNCTIONS - ( 12 Jul 2023 16:39 )  Alb: 3.0 g/dL / Pro: 6.1 gm/dL / ALK PHOS: 102 U/L / ALT: 12 U/L / AST: 8 U/L / GGT: x           PT/INR - ( 13 Jul 2023 07:02 )   PT: 12.8 sec;   INR: 1.10 ratio         PTT - ( 12 Jul 2023 16:39 )  PTT:28.2 sec      Urinalysis Basic - ( 13 Jul 2023 07:02 )    Color: x / Appearance: x / SG: x / pH: x  Gluc: 96 mg/dL / Ketone: x  / Bili: x / Urobili: x   Blood: x / Protein: x / Nitrite: x   Leuk Esterase: x / RBC: x / WBC x   Sq Epi: x / Non Sq Epi: x / Bacteria: x          EKG:    TELEMETRY:    CARDIAC TESTS:    RADIOLOGY & ADDITIONAL STUDIES:    ASSESSMENT & PLAN: HPI:  82 y/o F with PMH HTN, HLD, NICM, Mild mitral regurgitation, bilateral moderate ICA stenosis, IBS. Admitted to telemetry unit for new onset SOB. As per patient she was in the pool wit family when she experienced sudden onset of retrosternal chest pain assocated to significant dyspnea. On admission patient presenting with SOB not hypoxic. Imaging revealed pulmonary edema, patient received Lasix 20 mg with significant symptomatic improvement.       ER course: SpO2 93-97% on room air. Labs: , D-dimer 239, troponin 23.39 -> 83.83. Na 146, K+ 3.4, albumin 3.0, BNP 3224. 1st EKG: NSR with PVCs HR 85 bpm, normal intervals, , LBBB (pt is unsure if this is old or new), does not meet Sgarbossa criteria, no ST changes (personally reviewed).  2nd EKG: unchanged from prior -> NSR with frequent PVCs HR 81 bpm,  ms, does not meet Sgarbossa criteria, LBBB, trigeminy (personally reviewed).       Pt was given 20 mg IVP lasix. She is being admitted to telemetry for further management.    (13 Jul 2023 01:02)    83y Female    PAST MEDICAL & SURGICAL HISTORY:  2019 novel coronavirus disease (COVID-19)      History of IBS      H/O cardiomyopathy      History of heart valve abnormality      HTN (hypertension)      HLD (hyperlipidemia)      [place] (hard of hearing)      H/O carotid artery stenosis      S/P cataract surgery      S/P cholecystectomy      H/O knee surgery          MEDICATIONS  (STANDING):  atorvastatin 40 milliGRAM(s) Oral at bedtime  carvedilol 12.5 milliGRAM(s) Oral every 12 hours  cholecalciferol 1000 Unit(s) Oral daily  clopidogrel Tablet 75 milliGRAM(s) Oral daily  cyanocobalamin 1000 MICROGram(s) Oral daily  enoxaparin Injectable 40 milliGRAM(s) SubCutaneous every 24 hours  furosemide   Injectable 40 milliGRAM(s) IV Push daily  lactobacillus acidophilus 1 Tablet(s) Oral two times a day with meals  multivitamin 1 Tablet(s) Oral daily  omega-3-Acid Ethyl Esters 2 Gram(s) Oral two times a day  potassium chloride    Tablet ER 20 milliEquivalent(s) Oral once  vitamin E 400 International Unit(s) Oral daily  zinc sulfate 220 milliGRAM(s) Oral daily    MEDICATIONS  (PRN):  acetaminophen     Tablet .. 650 milliGRAM(s) Oral every 6 hours PRN Temp greater or equal to 38C (100.4F), Mild Pain (1 - 3)  melatonin 3 milliGRAM(s) Oral at bedtime PRN Insomnia  nitroglycerin     SubLingual 0.4 milliGRAM(s) SubLingual every 5 minutes PRN Chest Pain      Allergies    ondansetron (Unknown)  strawberry (Unknown)  sulfa drugs (Unknown)  codeine (Unknown)    Intolerances        SOCIAL HISTORY: Denies tobacco, etoh abuse or illicit drug use    FAMILY HISTORY:  Family history of CVA (Father)        Vital Signs Last 24 Hrs  T(C): 36.4 (13 Jul 2023 08:33), Max: 37 (12 Jul 2023 21:25)  T(F): 97.6 (13 Jul 2023 08:33), Max: 98.6 (12 Jul 2023 21:25)  HR: 77 (13 Jul 2023 08:33) (77 - 88)  BP: 145/79 (13 Jul 2023 08:33) (115/57 - 145/79)  BP(mean): --  RR: 18 (13 Jul 2023 08:33) (18 - 18)  SpO2: 97% (13 Jul 2023 08:33) (93% - 99%)    Parameters below as of 13 Jul 2023 08:33  Patient On (Oxygen Delivery Method): room air        REVIEW OF SYSTEMS:    CONSTITUTIONAL:  As per HPI.  HEENT:  Eyes:  No diplopia or blurred vision. ENT:  No earache, sore throat or runny nose.  CARDIOVASCULAR:  No pressure, squeezing, strangling, tightness, heaviness or aching about the chest, neck, axilla or epigastrium.  RESPIRATORY:  No cough, shortness of breath, PND or orthopnea.  GASTROINTESTINAL:  No nausea, vomiting or diarrhea.  GENITOURINARY:  No dysuria, frequency or urgency.  MUSCULOSKELETAL:  As per HPI.  SKIN:  No change in skin, hair or nails.  NEUROLOGIC:  No paresthesias, fasciculations, seizures or weakness.  PSYCHIATRIC:  No disorder of thought or mood.  ENDOCRINE:  No heat or cold intolerance, polyuria or polydipsia.  HEMATOLOGICAL:  No easy bruising or bleedings:  .     PHYSICAL EXAMINATION:    GENERAL APPEARANCE:  Pt. is not currently dyspneic, in no distress. Pt. is alert, oriented, and pleasant.  HEENT:  Pupils are normal and react normally. No icterus. Mucous membranes well colored.  NECK:  Supple. No lymphadenopathy. Jugular venous pressure not elevated. Carotids equal.   HEART:   The cardiac impulse has a normal quality. There are no murmurs, rubs or gallops noted  CHEST:  Chest is clear to auscultation. Normal respiratory effort.  ABDOMEN:  Soft and nontender.   EXTREMITIES:  There is no edema.   SKIN:  No rash or significant lesions are noted.    I&O's Summary  - Not documented    LABS:                        12.1   6.75  )-----------( 129      ( 13 Jul 2023 07:02 )             36.8     07-13    147<H>  |  110<H>  |  14  ----------------------------<  96  3.4<L>   |  33<H>  |  0.61    Ca    9.1      13 Jul 2023 07:02  Phos  3.3     07-13  Mg     2.0     07-13    TPro  6.1  /  Alb  3.0<L>  /  TBili  0.5  /  DBili  x   /  AST  8<L>  /  ALT  12  /  AlkPhos  102  07-12    LIVER FUNCTIONS - ( 12 Jul 2023 16:39 )  Alb: 3.0 g/dL / Pro: 6.1 gm/dL / ALK PHOS: 102 U/L / ALT: 12 U/L / AST: 8 U/L / GGT: x           PT/INR - ( 13 Jul 2023 07:02 )   PT: 12.8 sec;   INR: 1.10 ratio         PTT - ( 12 Jul 2023 16:39 )  PTT:28.2 sec      Urinalysis Basic - ( 13 Jul 2023 07:02 )    Color: x / Appearance: x / SG: x / pH: x  Gluc: 96 mg/dL / Ketone: x  / Bili: x / Urobili: x   Blood: x / Protein: x / Nitrite: x   Leuk Esterase: x / RBC: x / WBC x   Sq Epi: x / Non Sq Epi: x / Bacteria: x          EKG: Trigeminy, LBBB, QRS: 125    TELEMETRY: Trigeminy    CARDIAC TESTS:  ECHO (7/13/23): EF: 30-35%, prelim read    RADIOLOGY & ADDITIONAL STUDIES:  - CXR: increased vascular congestion bilaterally, no consolidation, no pneumothorax, no effusion   - CTA: No pulmonary embolus. Findings of pulmonary edema including interlobular septal thickening, groundglass, and trace pleural effusions. Follow to resolution with repeat chest CT in one month, or sooner as clinically warranted. Additional findings suggestive of cardiac dysfunction. Recommend echocardiogram. Coronary artery calcifications. Mild emphysematous changes. Scattered sub-4 mm pulmonary nodules and numerous nonspecific small mediastinal nodes up to 9 mm size can be reevaluated on subsequent chest CT.    ASSESSMENT & PLAN:  ADHFrEF w/ new onset LBBB  Patient admitted for pulmonary edema 2/2 ADHFrEF, EP consulted for possible BiV PPM + ICD. Patient responding well to diuretic therapy with document improvement of symptoms NYHA IV -> II and documented weight loss of 2 lbs. Of note patient has history of NICM in January of 2015 with documented EF: 30%, Cath performed on 03/2015 with luminal irregularities of coronaries no hemodynamic significant lesions. Patient was placed on GDMT and LIfevest until June 2015 when repeat ECHO documented EF: 40%. She had been following with her private cardiologist Dr. Bhardwaj until April 2022, ECHO performed at that visit showed LVEF: 50% and mild mitral regurgitation.    Prior to this admission patient denies sick contacts, cough, fever, malaise or URI symptoms.     At the time of evaluation no official echo read is available, patient is scheduled for Angiography later today. Currently patient is not a candidate for BiV PPM + ICD.   - Patient is not a candidate for CRT as EF > 30% and NYHA <III and not on 3 months of GDMT.  - In the stetting of IHD patient would need to be reevaluated 40 days after PCI, to assess EF and NYHA class and determine eligibility for ICD. In the meanwhile patient would benefit form WCD if EF < 40%  - In the setting of NICM patient will need to be started on GDMT. Patient should follow with EP clinic at 90 days after starting GDMT to reassess EF and NYHA class and determine ICD eligibility. In the interim patient would benefit from WCD if EF <35%.    Plan  - EP will follow for Angiography and ECHO results  - Agree with holding ACEi/ARB in light of possible initiation of ARNi   - Continue Aspirin, Plavix, Coreg, Statin, Lasix    PENDING DISCUSSION WITH ATTENDING HPI:  84 y/o F with PMH HTN, HLD, NICM, Mild mitral regurgitation, bilateral moderate ICA stenosis, IBS. Admitted to telemetry unit for new onset SOB. As per patient she was in the pool wit family when she experienced sudden onset of retrosternal chest pain assocated to significant dyspnea. On admission patient presenting with SOB not hypoxic. Imaging revealed pulmonary edema, patient received Lasix 20 mg with significant symptomatic improvement.       ER course: SpO2 93-97% on room air. Labs: , D-dimer 239, troponin 23.39 -> 83.83. Na 146, K+ 3.4, albumin 3.0, BNP 3224. 1st EKG: NSR with PVCs HR 85 bpm, normal intervals, , LBBB (pt is unsure if this is old or new), does not meet Sgarbossa criteria, no ST changes (personally reviewed).  2nd EKG: unchanged from prior -> NSR with frequent PVCs HR 81 bpm,  ms, does not meet Sgarbossa criteria, LBBB, trigeminy (personally reviewed).       Pt was given 20 mg IVP lasix. She is being admitted to telemetry for further management.    (13 Jul 2023 01:02)    83y Female    PAST MEDICAL & SURGICAL HISTORY:  2019 novel coronavirus disease (COVID-19)      History of IBS      H/O cardiomyopathy      History of heart valve abnormality      HTN (hypertension)      HLD (hyperlipidemia)      [place] (hard of hearing)      H/O carotid artery stenosis      S/P cataract surgery      S/P cholecystectomy      H/O knee surgery          MEDICATIONS  (STANDING):  atorvastatin 40 milliGRAM(s) Oral at bedtime  carvedilol 12.5 milliGRAM(s) Oral every 12 hours  cholecalciferol 1000 Unit(s) Oral daily  clopidogrel Tablet 75 milliGRAM(s) Oral daily  cyanocobalamin 1000 MICROGram(s) Oral daily  enoxaparin Injectable 40 milliGRAM(s) SubCutaneous every 24 hours  furosemide   Injectable 40 milliGRAM(s) IV Push daily  lactobacillus acidophilus 1 Tablet(s) Oral two times a day with meals  multivitamin 1 Tablet(s) Oral daily  omega-3-Acid Ethyl Esters 2 Gram(s) Oral two times a day  potassium chloride    Tablet ER 20 milliEquivalent(s) Oral once  vitamin E 400 International Unit(s) Oral daily  zinc sulfate 220 milliGRAM(s) Oral daily    MEDICATIONS  (PRN):  acetaminophen     Tablet .. 650 milliGRAM(s) Oral every 6 hours PRN Temp greater or equal to 38C (100.4F), Mild Pain (1 - 3)  melatonin 3 milliGRAM(s) Oral at bedtime PRN Insomnia  nitroglycerin     SubLingual 0.4 milliGRAM(s) SubLingual every 5 minutes PRN Chest Pain      Allergies    ondansetron (Unknown)  strawberry (Unknown)  sulfa drugs (Unknown)  codeine (Unknown)    Intolerances        SOCIAL HISTORY: Denies tobacco, etoh abuse or illicit drug use    FAMILY HISTORY:  Family history of CVA (Father)        Vital Signs Last 24 Hrs  T(C): 36.4 (13 Jul 2023 08:33), Max: 37 (12 Jul 2023 21:25)  T(F): 97.6 (13 Jul 2023 08:33), Max: 98.6 (12 Jul 2023 21:25)  HR: 77 (13 Jul 2023 08:33) (77 - 88)  BP: 145/79 (13 Jul 2023 08:33) (115/57 - 145/79)  BP(mean): --  RR: 18 (13 Jul 2023 08:33) (18 - 18)  SpO2: 97% (13 Jul 2023 08:33) (93% - 99%)    Parameters below as of 13 Jul 2023 08:33  Patient On (Oxygen Delivery Method): room air        REVIEW OF SYSTEMS:    CONSTITUTIONAL:  As per HPI.  HEENT:  Eyes:  No diplopia or blurred vision. ENT:  No earache, sore throat or runny nose.  CARDIOVASCULAR:  No pressure, squeezing, strangling, tightness, heaviness or aching about the chest, neck, axilla or epigastrium.  RESPIRATORY:  No cough, shortness of breath, PND or orthopnea.  GASTROINTESTINAL:  No nausea, vomiting or diarrhea.  GENITOURINARY:  No dysuria, frequency or urgency.  MUSCULOSKELETAL:  As per HPI.  SKIN:  No change in skin, hair or nails.  NEUROLOGIC:  No paresthesias, fasciculations, seizures or weakness.  PSYCHIATRIC:  No disorder of thought or mood.  ENDOCRINE:  No heat or cold intolerance, polyuria or polydipsia.  HEMATOLOGICAL:  No easy bruising or bleedings:  .     PHYSICAL EXAMINATION:    GENERAL APPEARANCE:  Pt. is not currently dyspneic, in no distress. Pt. is alert, oriented, and pleasant.  HEENT:  Pupils are normal and react normally. No icterus. Mucous membranes well colored.  NECK:  Supple. No lymphadenopathy. Jugular venous pressure not elevated. Carotids equal.   HEART:   The cardiac impulse has a normal quality. There are no murmurs, rubs or gallops noted  CHEST:  Chest is clear to auscultation. Normal respiratory effort.  ABDOMEN:  Soft and nontender.   EXTREMITIES:  There is no edema.   SKIN:  No rash or significant lesions are noted.    I&O's Summary  - Not documented    LABS:                        12.1   6.75  )-----------( 129      ( 13 Jul 2023 07:02 )             36.8     07-13    147<H>  |  110<H>  |  14  ----------------------------<  96  3.4<L>   |  33<H>  |  0.61    Ca    9.1      13 Jul 2023 07:02  Phos  3.3     07-13  Mg     2.0     07-13    TPro  6.1  /  Alb  3.0<L>  /  TBili  0.5  /  DBili  x   /  AST  8<L>  /  ALT  12  /  AlkPhos  102  07-12    LIVER FUNCTIONS - ( 12 Jul 2023 16:39 )  Alb: 3.0 g/dL / Pro: 6.1 gm/dL / ALK PHOS: 102 U/L / ALT: 12 U/L / AST: 8 U/L / GGT: x           PT/INR - ( 13 Jul 2023 07:02 )   PT: 12.8 sec;   INR: 1.10 ratio         PTT - ( 12 Jul 2023 16:39 )  PTT:28.2 sec      Urinalysis Basic - ( 13 Jul 2023 07:02 )    Color: x / Appearance: x / SG: x / pH: x  Gluc: 96 mg/dL / Ketone: x  / Bili: x / Urobili: x   Blood: x / Protein: x / Nitrite: x   Leuk Esterase: x / RBC: x / WBC x   Sq Epi: x / Non Sq Epi: x / Bacteria: x          EKG: Trigeminy, LBBB, QRS: 125    TELEMETRY: Trigeminy    CARDIAC TESTS:  ECHO (7/13/23): EF: 30-35%, prelim read    RADIOLOGY & ADDITIONAL STUDIES:  - CXR: increased vascular congestion bilaterally, no consolidation, no pneumothorax, no effusion   - CTA: No pulmonary embolus. Findings of pulmonary edema including interlobular septal thickening, groundglass, and trace pleural effusions. Follow to resolution with repeat chest CT in one month, or sooner as clinically warranted. Additional findings suggestive of cardiac dysfunction. Recommend echocardiogram. Coronary artery calcifications. Mild emphysematous changes. Scattered sub-4 mm pulmonary nodules and numerous nonspecific small mediastinal nodes up to 9 mm size can be reevaluated on subsequent chest CT.    ASSESSMENT & PLAN:  ADHFrEF w/ new onset LBBB  Patient admitted for pulmonary edema 2/2 ADHFrEF, EP consulted for possible BiV PPM + ICD. Patient responding well to diuretic therapy with document improvement of symptoms NYHA IV -> II and documented weight loss of 2 lbs. Of note patient has history of NICM in January of 2015 with documented EF: 30%, Cath performed on 03/2015 with luminal irregularities of coronaries no hemodynamic significant lesions. Patient was placed on GDMT and LIfevest until June 2015 when repeat ECHO documented EF: 40%. She had been following with her private cardiologist Dr. Bhardwaj until April 2022, ECHO performed at that visit showed LVEF: 50% and mild mitral regurgitation.    Prior to this admission patient denies sick contacts, cough, fever, malaise or URI symptoms. No history of SCD or syncope      Plan  At the time of evaluation no official echo read is available, patient is scheduled for Angiography later today. Currently patient is not a candidate for BiV PPM + ICD.   - Patient is not a candidate for CRT as QRS < 130 ms and not on 3 months of GDMT.  - In the stetting of IHD patient would need to be reevaluated 40 days after PCI, to assess EF and NYHA class and determine eligibility for ICD. EP will follow in house for consideration of WCD at discharge, at this time unlikely to benefit given no clinical evidence of ventricular arrhythmias.  - In the setting of NICM patient will need to be started on GDMT. Patient should follow with EP clinic at 90 days after starting GDMT to reassess EF and NYHA class and determine ICD eligibility.   - EP will follow for Angiography and ECHO results  - Agree with holding ACEi/ARB in light of possible initiation of ARNi   - Continue Aspirin, Plavix, Coreg, Statin, Lasix HPI:  82 y/o F with PMH HTN, HLD, NICM, Mild mitral regurgitation, bilateral moderate ICA stenosis, IBS. Admitted to telemetry unit for new onset SOB. As per patient she was in the pool wit family when she experienced sudden onset of retrosternal chest pain assocated to significant dyspnea. On admission patient presenting with SOB not hypoxic. Imaging revealed pulmonary edema, patient received Lasix 20 mg with significant symptomatic improvement.       ER course: SpO2 93-97% on room air. Labs: , D-dimer 239, troponin 23.39 -> 83.83. Na 146, K+ 3.4, albumin 3.0, BNP 3224. 1st EKG: NSR with PVCs HR 85 bpm, normal intervals, , LBBB (pt is unsure if this is old or new), does not meet Sgarbossa criteria, no ST changes (personally reviewed).  2nd EKG: unchanged from prior -> NSR with frequent PVCs HR 81 bpm,  ms, does not meet Sgarbossa criteria, LBBB, trigeminy (personally reviewed).       Pt was given 20 mg IVP lasix. She is being admitted to telemetry for further management.    (13 Jul 2023 01:02)    83y Female    PAST MEDICAL & SURGICAL HISTORY:  2019 novel coronavirus disease (COVID-19)      History of IBS      H/O cardiomyopathy      History of heart valve abnormality      HTN (hypertension)      HLD (hyperlipidemia)      [place] (hard of hearing)      H/O carotid artery stenosis      S/P cataract surgery      S/P cholecystectomy      H/O knee surgery          MEDICATIONS  (STANDING):  atorvastatin 40 milliGRAM(s) Oral at bedtime  carvedilol 12.5 milliGRAM(s) Oral every 12 hours  cholecalciferol 1000 Unit(s) Oral daily  clopidogrel Tablet 75 milliGRAM(s) Oral daily  cyanocobalamin 1000 MICROGram(s) Oral daily  enoxaparin Injectable 40 milliGRAM(s) SubCutaneous every 24 hours  furosemide   Injectable 40 milliGRAM(s) IV Push daily  lactobacillus acidophilus 1 Tablet(s) Oral two times a day with meals  multivitamin 1 Tablet(s) Oral daily  omega-3-Acid Ethyl Esters 2 Gram(s) Oral two times a day  potassium chloride    Tablet ER 20 milliEquivalent(s) Oral once  vitamin E 400 International Unit(s) Oral daily  zinc sulfate 220 milliGRAM(s) Oral daily    MEDICATIONS  (PRN):  acetaminophen     Tablet .. 650 milliGRAM(s) Oral every 6 hours PRN Temp greater or equal to 38C (100.4F), Mild Pain (1 - 3)  melatonin 3 milliGRAM(s) Oral at bedtime PRN Insomnia  nitroglycerin     SubLingual 0.4 milliGRAM(s) SubLingual every 5 minutes PRN Chest Pain      Allergies    ondansetron (Unknown)  strawberry (Unknown)  sulfa drugs (Unknown)  codeine (Unknown)    Intolerances        SOCIAL HISTORY: Denies tobacco, etoh abuse or illicit drug use    FAMILY HISTORY:  Family history of CVA (Father)        Vital Signs Last 24 Hrs  T(C): 36.4 (13 Jul 2023 08:33), Max: 37 (12 Jul 2023 21:25)  T(F): 97.6 (13 Jul 2023 08:33), Max: 98.6 (12 Jul 2023 21:25)  HR: 77 (13 Jul 2023 08:33) (77 - 88)  BP: 145/79 (13 Jul 2023 08:33) (115/57 - 145/79)  BP(mean): --  RR: 18 (13 Jul 2023 08:33) (18 - 18)  SpO2: 97% (13 Jul 2023 08:33) (93% - 99%)    Parameters below as of 13 Jul 2023 08:33  Patient On (Oxygen Delivery Method): room air        REVIEW OF SYSTEMS:    CONSTITUTIONAL:  As per HPI.  HEENT:  Eyes:  No diplopia or blurred vision. ENT:  No earache, sore throat or runny nose.  CARDIOVASCULAR:  No pressure, squeezing, strangling, tightness, heaviness or aching about the chest, neck, axilla or epigastrium.  RESPIRATORY:  No cough, shortness of breath, PND or orthopnea.  GASTROINTESTINAL:  No nausea, vomiting or diarrhea.  GENITOURINARY:  No dysuria, frequency or urgency.  MUSCULOSKELETAL:  As per HPI.  SKIN:  No change in skin, hair or nails.  NEUROLOGIC:  No paresthesias, fasciculations, seizures or weakness.  PSYCHIATRIC:  No disorder of thought or mood.  ENDOCRINE:  No heat or cold intolerance, polyuria or polydipsia.  HEMATOLOGICAL:  No easy bruising or bleedings:  .     PHYSICAL EXAMINATION:    GENERAL APPEARANCE:  Pt. is not currently dyspneic, in no distress. Pt. is alert, oriented, and pleasant.  HEENT:  Pupils are normal and react normally. No icterus. Mucous membranes well colored.  NECK:  Supple. No lymphadenopathy. Jugular venous pressure not elevated. Carotids equal.   HEART:   The cardiac impulse has a normal quality. There are no murmurs, rubs or gallops noted  CHEST:  Chest is clear to auscultation. Normal respiratory effort.  ABDOMEN:  Soft and nontender.   EXTREMITIES:  There is no edema.   SKIN:  No rash or significant lesions are noted.    I&O's Summary  - Not documented    LABS:                        12.1   6.75  )-----------( 129      ( 13 Jul 2023 07:02 )             36.8     07-13    147<H>  |  110<H>  |  14  ----------------------------<  96  3.4<L>   |  33<H>  |  0.61    Ca    9.1      13 Jul 2023 07:02  Phos  3.3     07-13  Mg     2.0     07-13    TPro  6.1  /  Alb  3.0<L>  /  TBili  0.5  /  DBili  x   /  AST  8<L>  /  ALT  12  /  AlkPhos  102  07-12    LIVER FUNCTIONS - ( 12 Jul 2023 16:39 )  Alb: 3.0 g/dL / Pro: 6.1 gm/dL / ALK PHOS: 102 U/L / ALT: 12 U/L / AST: 8 U/L / GGT: x           PT/INR - ( 13 Jul 2023 07:02 )   PT: 12.8 sec;   INR: 1.10 ratio         PTT - ( 12 Jul 2023 16:39 )  PTT:28.2 sec      Urinalysis Basic - ( 13 Jul 2023 07:02 )    Color: x / Appearance: x / SG: x / pH: x  Gluc: 96 mg/dL / Ketone: x  / Bili: x / Urobili: x   Blood: x / Protein: x / Nitrite: x   Leuk Esterase: x / RBC: x / WBC x   Sq Epi: x / Non Sq Epi: x / Bacteria: x          EKG: Trigeminy, LBBB, QRS: 125    TELEMETRY: Trigeminy    CARDIAC TESTS:  ECHO (7/13/23): EF: 30-35%, prelim read    RADIOLOGY & ADDITIONAL STUDIES:  - CXR: increased vascular congestion bilaterally, no consolidation, no pneumothorax, no effusion   - CTA: No pulmonary embolus. Findings of pulmonary edema including interlobular septal thickening, groundglass, and trace pleural effusions. Follow to resolution with repeat chest CT in one month, or sooner as clinically warranted. Additional findings suggestive of cardiac dysfunction. Recommend echocardiogram. Coronary artery calcifications. Mild emphysematous changes. Scattered sub-4 mm pulmonary nodules and numerous nonspecific small mediastinal nodes up to 9 mm size can be reevaluated on subsequent chest CT.    ASSESSMENT & PLAN:  ADHFrEF w/ new onset LBBB  Patient admitted for pulmonary edema 2/2 ADHFrEF, EP consulted for possible BiV PPM + ICD. Patient responding well to diuretic therapy with document improvement of symptoms NYHA IV -> II and documented weight loss of 2 lbs. Of note patient has history of NICM in January of 2015 with documented EF: 30%, Cath performed on 03/2015 with luminal irregularities of coronaries no hemodynamic significant lesions. Patient was placed on GDMT and LIfevest until June 2015 when repeat ECHO documented EF: 40%. She had been following with her private cardiologist Dr. Bhardwaj until April 2022, ECHO performed at that visit showed LVEF: 50% and mild mitral regurgitation.    Prior to this admission patient denies sick contacts, cough, fever, malaise or URI symptoms. No history of SCD or syncope      Plan  At the time of evaluation no official echo read is available, patient is scheduled for Angiography later today. Currently patient is not a candidate for BiV PPM + ICD.   - Patient is not a candidate for CRT as QRS < 130 ms and not on 3 months of GDMT.  - In the stetting of IHD patient would need to be reevaluated 40 days after PCI, to assess EF and NYHA class and determine eligibility for ICD. EP will follow in house for consideration of WCD at discharge, at this time unlikely to benefit given no clinical evidence of ventricular arrhythmias.  - In the setting of NICM patient will need to be started on GDMT. Patient should follow with EP clinic at 90 days after starting GDMT to reassess EF and NYHA class and determine ICD eligibility.   - EP will follow for Angiography and ECHO results  - Agree with holding ACEi/ARB in light of possible initiation of ARNi   - Continue Aspirin, Plavix, Coreg, Statin, Lasix  - Replete K to goal of > 4

## 2023-07-13 NOTE — PATIENT PROFILE ADULT - NSPROPASSIVESMOKEEXPOSURE_GEN_A_NUR
Patient, would like a refill on furosemide medication. Pt is out of medication. Pharmacy: [facility], [address] (listed.      Current Outpatient Medications   Medication Sig Dispense Refill   • FUROSEMIDE 40 MG Oral Tab TAKE ONE TABLET
Unknown

## 2023-07-13 NOTE — H&P ADULT - HISTORY OF PRESENT ILLNESS
82 y/o F with PMH     ER course: SpO2 93-97% on room air. Labs: , D-dimer 239, troponin 23.39 -> 83.83. Na 146, K+ 3.4, albumin 3.0, BNP 3224.     EKG:     Imaging:   - CXR: increased vascular congestion bilaterally, no consolidation, no pneumothorax, no effusion (personally reviewed).   - CTA: No pulmonary embolus. Findings of pulmonary edema including interlobular septal thickening, groundglass, and trace pleural effusions. Follow to resolution with repeat chest CT in one month, or sooner as clinically warranted. Additional findings suggestive of cardiac dysfunction. Recommend echocardiogram. Coronary artery calcifications. Mild emphysematous changes. Scattered sub-4 mm pulmonary nodules and numerous nonspecific small mediastinal nodes up to 9 mm size can be reevaluated on subsequent chest CT.    Pt was given 20 mg IVP lasix. She is being admitted to telemetry for further management.    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 Pt states that she got out of the pool to go to the bathroom. She felt a heaviness of her chest and was unable to catch her breath. The chest heaviness was located in the middle of her chest without radiation. She has been getting SOB on exertion recently mostly when she walks up 2 flights of stairs. Uses 1 pillow to sleep at night. Does not lay flat on her back. Reports having runny nose, cough, and swelling of her feet. Last ECHO was 1 year ago. She was following with her cardiologist for years but he no longer takes her insurance and she has not been for any recent testing. 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 Denies headaches, blurry vision, dizziness, fevers, chills, abdominal pain, N/V, diarrhea/constipation.      ER course: SpO2 93-97% on room air. Labs: , D-dimer 239, troponin 23.39 -> 83.83. Na 146, K+ 3.4, albumin 3.0, BNP 3224. 1st EKG: NSR with PVCs HR 85 bpm, normal intervals, , LBBB (pt is unsure if this is old or new), does not meet [place] criteria, no ST changes (personally reviewed).  2nd EKG: unchanged from prior -> NSR with frequent PVCs HR 81 bpm,  ms, does not meet [place] criteria, LBBB, trigeminy (personally reviewed).     Imaging:   - CXR: increased vascular congestion bilaterally, no consolidation, no pneumothorax, no effusion (personally reviewed).   - CTA: No pulmonary embolus. Findings of pulmonary edema including interlobular septal thickening, groundglass, and trace pleural effusions. Follow to resolution with repeat chest CT in one month, or sooner as clinically warranted. Additional findings suggestive of cardiac dysfunction. Recommend echocardiogram. Coronary artery calcifications. Mild emphysematous changes. Scattered sub-4 mm pulmonary nodules and numerous nonspecific small mediastinal nodes up to 9 mm size can be reevaluated on subsequent chest CT.    Pt was given 20 mg IVP lasix. She is being admitted to telemetry for further management.

## 2023-07-13 NOTE — PROGRESS NOTE ADULT - SUBJECTIVE AND OBJECTIVE BOX
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 Pt states that she got out of the pool to go to the bathroom. She felt a heaviness of her chest and was unable to catch her breath. The chest heaviness was located in the middle of her chest without radiation. She has been getting SOB on exertion recently mostly when she walks up 2 flights of stairs. Uses 1 pillow to sleep at night. Does not lay flat on her back. Reports having runny nose, cough, and swelling of her feet. Last ECHO was 1 year ago. She was following with her cardiologist for years but he no longer takes her insurance and she has not been for any recent testing. 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 Denies headaches, blurry vision, dizziness, fevers, chills, abdominal pain, N/V, diarrhea/constipation.      7/13 - no cp palps; breathing much improved after lasix - urinating a lot    Vital Signs Last 24 Hrs  T(F): 97.6 (13 Jul 2023 08:33), Max: 98.6 (12 Jul 2023 21:25)  HR: 77 (13 Jul 2023 08:33) (77 - 88)  BP: 145/79 (13 Jul 2023 08:33) (115/57 - 145/79)  RR: 18 (13 Jul 2023 08:33) (18 - 18)  SpO2: 97% (13 Jul 2023 08:33) (95% - 99%)  Constitutional: NAD, awake and alert, sitting up in bed, no distress at this time   HEENT: PERR, EOMI  Neck: Soft and supple,  No JVD  Respiratory: Breath sounds are clear bilaterally, No wheezing, rales or rhonchi  Cardiovascular: S1 and S2, regular rate and rhythm, no Murmurs  Gastrointestinal: Bowel Sounds present, soft, nontender, nondistended  Extremities: No peripheral edema  Vascular: 2+ peripheral pulses  Neurological: A/O x 3, no focal deficits  Musculoskeletal: 5/5 strength b/l upper and lower extremities  Skin: No rashes    MEDICATIONS:  MEDICATIONS  (STANDING):  atorvastatin 40 milliGRAM(s) Oral at bedtime  carvedilol 12.5 milliGRAM(s) Oral every 12 hours  cholecalciferol 1000 Unit(s) Oral daily  clopidogrel Tablet 75 milliGRAM(s) Oral daily  cyanocobalamin 1000 MICROGram(s) Oral daily  enoxaparin Injectable 40 milliGRAM(s) SubCutaneous every 24 hours  furosemide   Injectable 40 milliGRAM(s) IV Push daily  lactobacillus acidophilus 1 Tablet(s) Oral two times a day with meals  multivitamin 1 Tablet(s) Oral daily  omega-3-Acid Ethyl Esters 2 Gram(s) Oral two times a day  vitamin E 400 International Unit(s) Oral daily  zinc sulfate 220 milliGRAM(s) Oral daily      LABS: All Labs Reviewed:                        12.1   6.75  )-----------( 129      ( 13 Jul 2023 07:02 )             36.8  147<H>  |  110<H>  |  14  ----------------------------<  96  3.4<L>   |  33<H>  |  0.61  Ca    9.1      13 Jul 2023 07:02  Phos  3.3     07-13  Mg     2.0     07-13  TPro  6.1  /  Alb  3.0<L>  /  TBili  0.5  /  DBili  x   /  AST  8<L>  /  ALT  12  /  AlkPhos  102  07-12  PT/INR - ( 13 Jul 2023 07:02 )   PT: 12.8 sec;   INR: 1.10 ratio      RADIOLOGY/EKG:  < from: CT Angio Chest PE Protocol w/ IV Cont (07.12.23 @ 18:27) >  No pulmonary embolus.  Findings of pulmonary edema including interlobular septal thickening,   groundglass, and trace pleural effusions. Follow to resolution with   repeat chest CT in one month, or sooner as clinically warranted.  Additional findings suggestive of cardiac dysfunction. Recommend   echocardiogram. Coronary artery calcifications.  Mild emphysematous changes. Scattered sub-4 mm pulmonary nodules and   numerous nonspecific small mediastinal nodes up to 9 mm size can be   reevaluated on subsequent chest CT.    < from: US Duplex Carotid Arteries Complete, Bilateral (07.13.23 @ 08:36) >  IMPRESSION: No significant hemodynamic stenosis of the left internal   carotid artery.  Elevated peak systolic velocity at the right distal ICA, suggestive of a   moderate (50-69%) stenosis.  Irregular arterial pulses are noted.  Measurement of carotid stenosis is based on velocity parameters that   correlate the residual internal carotid diameter with that of the more   distal vessel in accordance with a method such as the [place] [employer]   Symptomatic Carotid Endarterectomy Trial (NASCET).

## 2023-07-13 NOTE — CONSULT NOTE ADULT - SUBJECTIVE AND OBJECTIVE BOX
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 Pt states that she got out of the pool to go to the bathroom. She felt a heaviness of her chest and was unable to catch her breath. The chest heaviness was located in the middle of her chest without radiation. She has been getting SOB on exertion recently mostly when she walks up 2 flights of stairs. Uses 1 pillow to sleep at night. Does not lay flat on her back. Reports having runny nose, cough, and swelling of her feet. Last ECHO was 1 year ago. She was following with her cardiologist for years but he no longer takes her insurance and she has not been for any recent testing. 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 Denies headaches, blurry vision, dizziness, fevers, chills, abdominal pain, N/V, diarrhea/constipation.      ER course: SpO2 93-97% on room air. Labs: , D-dimer 239, troponin 23.39 -> 83.83. Na 146, K+ 3.4, albumin 3.0, BNP 3224. 1st EKG: NSR with PVCs HR 85 bpm, normal intervals, , LBBB (pt is unsure if this is old or new), does not meet [place] criteria, no ST changes (personally reviewed).  2nd EKG: unchanged from prior -> NSR with frequent PVCs HR 81 bpm,  ms, does not meet [place] criteria, LBBB, trigeminy (personally reviewed).     Imaging:   - CXR: increased vascular congestion bilaterally, no consolidation, no pneumothorax, no effusion (personally reviewed).   - CTA: No pulmonary embolus. Findings of pulmonary edema including interlobular septal thickening, groundglass, and trace pleural effusions. Follow to resolution with repeat chest CT in one month, or sooner as clinically warranted. Additional findings suggestive of cardiac dysfunction. Recommend echocardiogram. Coronary artery calcifications. Mild emphysematous changes. Scattered sub-4 mm pulmonary nodules and numerous nonspecific small mediastinal nodes up to 9 mm size can be reevaluated on subsequent chest CT.    Pt was given 20 mg IVP lasix. She is being admitted to telemetry for further management.    (13 Jul 2023 01:02)    7/13. Cardiologist consulted for chest pain  Noted wit chest tightness, unable to catch her breath on exertion  Previously followed with Dr. Bhardwaj   Patient in 2015 ~ had cardiac cath revealing luminal        PREVIOUS DIAGNOSTIC TESTING:      ECHO: FINDINGS:    STRESS: FINDINGS:    CATHETERIZATION: FINDINGS:    MEDICATIONS  (STANDING):  atorvastatin 40 milliGRAM(s) Oral at bedtime  carvedilol 12.5 milliGRAM(s) Oral every 12 hours  cholecalciferol 1000 Unit(s) Oral daily  cyanocobalamin 1000 MICROGram(s) Oral daily  enoxaparin Injectable 40 milliGRAM(s) SubCutaneous every 24 hours  furosemide   Injectable 40 milliGRAM(s) IV Push daily  lactobacillus acidophilus 1 Tablet(s) Oral two times a day with meals  lisinopril 40 milliGRAM(s) Oral daily  multivitamin 1 Tablet(s) Oral daily  omega-3-Acid Ethyl Esters 2 Gram(s) Oral two times a day  potassium chloride    Tablet ER 20 milliEquivalent(s) Oral once  potassium chloride   Powder 40 milliEquivalent(s) Oral once  vitamin E 400 International Unit(s) Oral daily  zinc sulfate 220 milliGRAM(s) Oral daily    MEDICATIONS  (PRN):  acetaminophen     Tablet .. 650 milliGRAM(s) Oral every 6 hours PRN Temp greater or equal to 38C (100.4F), Mild Pain (1 - 3)  melatonin 3 milliGRAM(s) Oral at bedtime PRN Insomnia  nitroglycerin     SubLingual 0.4 milliGRAM(s) SubLingual every 5 minutes PRN Chest Pain          PHYSICAL EXAM:  Vital Signs Last 24 Hrs  T(C): 36.4 (13 Jul 2023 08:33), Max: 37 (12 Jul 2023 21:25)  T(F): 97.6 (13 Jul 2023 08:33), Max: 98.6 (12 Jul 2023 21:25)  HR: 77 (13 Jul 2023 08:33) (77 - 88)  BP: 145/79 (13 Jul 2023 08:33) (115/57 - 145/79)  BP(mean): --  RR: 18 (13 Jul 2023 08:33) (18 - 18)  SpO2: 97% (13 Jul 2023 08:33) (93% - 99%)    Parameters below as of 13 Jul 2023 08:33  Patient On (Oxygen Delivery Method): room air        Constitutional: NAD, awake and alert  HEENT: PERR, EOMI, Normal Hearing, MMM  Neck: Soft and supple, No LAD, No JVD  Respiratory: Breath sounds are clear bilaterally, No wheezing, rales or rhonchi  Cardiovascular: S1 and S2, regular rate and rhythm, no Murmurs, gallops or rubs  Gastrointestinal: Bowel Sounds present, soft, nontender, nondistended, no guarding, no rebound  Extremities: No peripheral edema  Vascular: 2+ peripheral pulses  Neurological: A/O x 3, no focal deficits  Musculoskeletal: 5/5 strength b/l upper and lower extremities  Skin: No rashes    =======================================    INTERPRETATION OF TELEMETRY:    ECG:    ========================================    LABS:                        12.1   6.75  )-----------( 129      ( 13 Jul 2023 07:02 )             36.8     07-13    147<H>  |  110<H>  |  14  ----------------------------<  96  3.4<L>   |  33<H>  |  0.61    Ca    9.1      13 Jul 2023 07:02  Phos  3.3     07-13  Mg     2.0     07-13    TPro  6.1  /  Alb  3.0<L>  /  TBili  0.5  /  DBili  x   /  AST  8<L>  /  ALT  12  /  AlkPhos  102  07-12        PT/INR - ( 13 Jul 2023 07:02 )   PT: 12.8 sec;   INR: 1.10 ratio         PTT - ( 12 Jul 2023 16:39 )  PTT:28.2 sec  Urinalysis Basic - ( 13 Jul 2023 07:02 )    Color: x / Appearance: x / SG: x / pH: x  Gluc: 96 mg/dL / Ketone: x  / Bili: x / Urobili: x   Blood: x / Protein: x / Nitrite: x   Leuk Esterase: x / RBC: x / WBC x   Sq Epi: x / Non Sq Epi: x / Bacteria: x      I&O's Summary    BNP      RADIOLOGY & ADDITIONAL STUDIES: CHIEF COMPLAINT: Patient is a 83y old  Female who presents with a chief complaint of Acute on chronic systolic congestive heart failure    FROM H&P: 82 y/o F with PMH COVID, IBS, Cardiomyopathy, "leaky heart valves," HTN, HLD, [place], 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 Pt states that she got out of the pool to go to the bathroom. She felt a heaviness of her chest and was unable to catch her breath. The chest heaviness was located in the middle of her chest without radiation. She has been getting SOB on exertion recently mostly when she walks up 2 flights of stairs. Uses 1 pillow to sleep at night. Does not lay flat on her back. Reports having runny nose, cough, and swelling of her feet. Last ECHO was 1 year ago. She was following with her cardiologist for years but he no longer takes her insurance and she has not been for any recent testing. 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 Denies headaches, blurry vision, dizziness, fevers, chills, abdominal pain, N/V, diarrhea/constipation.      ER course: SpO2 93-97% on room air. Labs: , D-dimer 239, troponin 23.39 -> 83.83. Na 146, K+ 3.4, albumin 3.0, BNP 3224. 1st EKG: NSR with PVCs HR 85 bpm, normal intervals, , LBBB (pt is unsure if this is old or new), does not meet [place] criteria, no ST changes (personally reviewed).  2nd EKG: unchanged from prior -> NSR with frequent PVCs HR 81 bpm,  ms, does not meet [place] criteria, LBBB, trigeminy (personally reviewed).     Imaging:   - CXR: increased vascular congestion bilaterally, no consolidation, no pneumothorax, no effusion (personally reviewed).   - CTA: No pulmonary embolus. Findings of pulmonary edema including interlobular septal thickening, groundglass, and trace pleural effusions. Follow to resolution with repeat chest CT in one month, or sooner as clinically warranted. Additional findings suggestive of cardiac dysfunction. Recommend echocardiogram. Coronary artery calcifications. Mild emphysematous changes. Scattered sub-4 mm pulmonary nodules and numerous nonspecific small mediastinal nodes up to 9 mm size can be reevaluated on subsequent chest CT.    Pt was given 20 mg IVP lasix. She is being admitted to telemetry for further management.    (13 Jul 2023 01:02)    7/13. Cardiologist consulted for chest pain  Noted wit chest tightness, unable to catch her breath on exertion  Previously followed with Dr. Bhardwaj   Patient in 2015 ~ had cardiac cath revealing luminal  irregularities, normal coronaries  TTE on 2022 EF 50%, mild MR    MEDICATIONS:  atorvastatin 40 milliGRAM(s) Oral at bedtime  carvedilol 12.5 milliGRAM(s) Oral every 12 hours  cholecalciferol 1000 Unit(s) Oral daily  cyanocobalamin 1000 MICROGram(s) Oral daily  enoxaparin Injectable 40 milliGRAM(s) SubCutaneous every 24 hours  furosemide   Injectable 40 milliGRAM(s) IV Push daily  lactobacillus acidophilus 1 Tablet(s) Oral two times a day with meals  lisinopril 40 milliGRAM(s) Oral daily  multivitamin 1 Tablet(s) Oral daily  omega-3-Acid Ethyl Esters 2 Gram(s) Oral two times a day  potassium chloride    Tablet ER 20 milliEquivalent(s) Oral once  potassium chloride   Powder 40 milliEquivalent(s) Oral once  vitamin E 400 International Unit(s) Oral daily  zinc sulfate 220 milliGRAM(s) Oral daily    MEDICATIONS  (PRN):  acetaminophen     Tablet .. 650 milliGRAM(s) Oral every 6 hours PRN Temp greater or equal to 38C (100.4F), Mild Pain (1 - 3)  melatonin 3 milliGRAM(s) Oral at bedtime PRN Insomnia  nitroglycerin     SubLingual 0.4 milliGRAM(s) SubLingual every 5 minutes PRN Chest Pain    HOME MEDICATIONS:  atorvastatin 20 mg oral tablet: 1 tab(s) orally once a day (at bedtime) (12 Jul 2023 21:14)  carvedilol 12.5 mg oral tablet: 1 tab(s) orally 2 times a day (12 Jul 2023 21:13)  Co-Q10 100 mg oral capsule: 1 cap(s) orally once a day (12 Jul 2023 21:18)  Fish Oil 3-6-9 OTC supplement:  (12 Jul 2023 21:18)  Garlic oral capsule: 1 cap(s) orally once a day (12 Jul 2023 21:18)  hydroCHLOROthiazide 12.5 mg oral tablet: 1 tab(s) orally once a day (12 Jul 2023 21:14)  isosorbide mononitrate 30 mg oral tablet, extended release: 0.5 tab(s) orally once a day (in the afternoon) (12 Jul 2023 21:13)  lisinopril 40 mg oral tablet: 1 tab(s) orally once a day (12 Jul 2023 21:14)  Multiple Vitamins oral tablet: 1 tab(s) orally once a day (12 Jul 2023 21:14)  Optiflex-G 750 mg oral tablet: 1 tab(s) orally once a day (12 Jul 2023 21:18)  Probiotic Formula oral capsule: 1 cap(s) orally once a day (12 Jul 2023 21:18)  resveratrol OTC anti-oxidant supplement:  (12 Jul 2023 21:21)  Vitamin B-12 1000 mcg oral tablet: 1 tab(s) orally once a day (12 Jul 2023 21:14)  Vitamin D3 25 mcg (1000 intl units) oral capsule: 1 cap(s) orally once a day (12 Jul 2023 21:14)  vitamin E 200 intl units oral capsule: 1 cap(s) orally once a day (12 Jul 2023 21:14)  zinc (as gluconate) 50 mg oral tablet: 1 tab(s) orally once a day (12 Jul 2023 21:18)    PHYSICAL EXAM:  T(C): 36.4 (13 Jul 2023 08:33), Max: 37 (12 Jul 2023 21:25)  T(F): 97.6 (13 Jul 2023 08:33), Max: 98.6 (12 Jul 2023 21:25)  HR: 77 (13 Jul 2023 08:33) (77 - 88)  BP: 145/79 (13 Jul 2023 08:33) (115/57 - 145/79)  RR: 18 (13 Jul 2023 08:33) (18 - 18)  SpO2: 97% (13 Jul 2023 08:33) (93% - 99%)    Parameters below as of 13 Jul 2023 08:33  Patient On (Oxygen Delivery Method): room air    Constitutional: NAD, awake and alert  HEENT: PERR, EOMI, Normal Hearing, MMM  Neck: Soft and supple, No LAD, No JVD  Respiratory: Breath sounds are clear bilaterally, No wheezing, rales or rhonchi  Cardiovascular: S1 and S2, regular rate and rhythm, no Murmurs, gallops or rubs  Gastrointestinal: Bowel Sounds present, soft, nontender, nondistended, no guarding, no rebound  Extremities: No peripheral edema  Vascular: 2+ peripheral pulses  Neurological: A/O x 3, no focal deficits  Musculoskeletal: 5/5 strength b/l upper and lower extremities  Skin: No rashes    =======================================    INTERPRETATION OF TELEMETRY: SR, bigem    ECG: LBBB, SR PVC    ========================================    LABS:                        12.1   6.75  )-----------( 129      ( 13 Jul 2023 07:02 )             36.8     07-13    147<H>  |  110<H>  |  14  ----------------------------<  96  3.4<L>   |  33<H>  |  0.61    Ca    9.1      13 Jul 2023 07:02  Phos  3.3     07-13  Mg     2.0     07-13    TPro  6.1  /  Alb  3.0<L>  /  TBili  0.5  /  DBili  x   /  AST  8<L>  /  ALT  12  /  AlkPhos  102  07-12    PT/INR - ( 13 Jul 2023 07:02 )   PT: 12.8 sec;   INR: 1.10 ratio       PTT - ( 12 Jul 2023 16:39 )  PTT:28.2 sec    TroponinI hsT: <-61.36, <-83.83, <-23.39    BNP 3224    RADIOLOGY & ADDITIONAL STUDIES:    7/13/23: US Duplex Carotid Arteries Complete, Bilateral: No significant hemodynamic stenosis of the left internal carotid artery. Elevated peak systolic velocity at the right distal ICA, suggestive of a moderate (50-69%) stenosis. Irregular arterial pulses are noted.    7/12/23: CT Angio Chest PE Protocol w/ IV Cont: No pulmonary embolus. Findings of pulmonary edema including interlobular septal thickening, groundglass, and trace pleural effusions. Follow to resolution with repeat chest CT in one month, or sooner as clinically warranted. Additional findings suggestive of cardiac dysfunction. Recommend echocardiogram. Coronary artery calcifications.    7/12/23: Xray Chest 1 View: 1. Prominent cardiac silhouette with central pulmonary venous engorgement and questionable early interstitial pulmonary edema.  2. No evidence of pneumonia or pleural effusion. 3. Degenerative changes of the spine and shoulders.

## 2023-07-13 NOTE — H&P ADULT - NSICDXPASTMEDICALHX_GEN_ALL_CORE_FT
PAST MEDICAL HISTORY:  2019 novel coronavirus disease (COVID-19)     H/O cardiomyopathy     H/O carotid artery stenosis     History of heart valve abnormality     History of IBS     HLD (hyperlipidemia)     [place] (hard of hearing)     HTN (hypertension)

## 2023-07-13 NOTE — DIETITIAN INITIAL EVALUATION ADULT - PERTINENT MEDS FT
MEDICATIONS  (STANDING):  atorvastatin 20 milliGRAM(s) Oral at bedtime  carvedilol 12.5 milliGRAM(s) Oral every 12 hours  cholecalciferol 1000 Unit(s) Oral daily  cyanocobalamin 1000 MICROGram(s) Oral daily  enoxaparin Injectable 40 milliGRAM(s) SubCutaneous every 24 hours  furosemide   Injectable 40 milliGRAM(s) IV Push daily  hydrochlorothiazide 12.5 milliGRAM(s) Oral daily  isosorbide   mononitrate ER Tablet (IMDUR) 15 milliGRAM(s) Oral daily  lactobacillus acidophilus 1 Tablet(s) Oral two times a day with meals  lisinopril 40 milliGRAM(s) Oral daily  multivitamin 1 Tablet(s) Oral daily  omega-3-Acid Ethyl Esters 2 Gram(s) Oral two times a day  potassium chloride   Powder 40 milliEquivalent(s) Oral once  vitamin E 400 International Unit(s) Oral daily  zinc sulfate 220 milliGRAM(s) Oral daily    MEDICATIONS  (PRN):  acetaminophen     Tablet .. 650 milliGRAM(s) Oral every 6 hours PRN Temp greater or equal to 38C (100.4F), Mild Pain (1 - 3)  aluminum hydroxide/magnesium hydroxide/simethicone Suspension 30 milliLiter(s) Oral every 4 hours PRN Dyspepsia  melatonin 3 milliGRAM(s) Oral at bedtime PRN Insomnia  nitroglycerin     SubLingual 0.4 milliGRAM(s) SubLingual every 5 minutes PRN Chest Pain

## 2023-07-13 NOTE — CONSULT NOTE ADULT - ASSESSMENT
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     #Chest Tightness, r/o ACS  Monitor on tele. SR, PVCs  TroponinI hsT: <-61.36, <-83.83, <-23.39  ECG - LBBB (appears new, records obtained from prior cardiology office)  Echo pending official review, prelim EF 30-35%  Continue Aspirin, add Plavix, Coreg, Statin   Hold HCTZ and Imdur  Plan for cardiac angiogram for today.    #hx Cardiomyopathy  Appears last Cardiac Cath in 2015, normal coronaries  - Continue with Coreg, hold lisinopril - will like to transition to Entresto, eventually introduce Farxiga    #HTN. Continue current medications for now    #HLD. Continue statin. Check lipid panel.    #Carotid Stenosis. C/w medical management . On aspirin and statin.        Case d/w Dr. 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     #Chest Tightness, NSTEMI.  Monitor on tele. SR, PVCs  TroponinI hsT: <-61.36, <-83.83, <-23.39  ECG - LBBB (appears new, records obtained from prior cardiology office)  Echo pending official review, prelim EF 30-35%  Continue Aspirin, add Plavix, Coreg, Statin   IV Lasix daily  Hold HCTZ and Imdur  Plan for cardiac angiogram for today.    #hx Cardiomyopathy  Appears last Cardiac Cath in 2015, normal coronaries  - Continue with Coreg, hold lisinopril - will like to transition to Entresto, eventually introduce Farxiga    #HTN. Continue current medications for now    #HLD. Continue statin. Check lipid panel.    #Carotid Stenosis. C/w medical management . On aspirin and statin.        Case d/w Dr. Wei

## 2023-07-13 NOTE — H&P ADULT - NSHPREVIEWOFSYSTEMS_GEN_ALL_CORE
Constitutional: negative for fatigue, negative for fever, negative for chills, negative for decreased appetite.  Skin: negative for rashes, negative for open wounds, negative for jaundice.   Eyes: negative for blurry vision, negative for double vision.   Ears, nose, throat: negative for ear pain, negative for nasal congestion, negative for sore throat, negative for lymph node swelling.   Cardiovascular: negative for chest pain, negative for palpitations, positive for lower extremity swelling, positive for chest discomfort.    Respiratory: positive for shortness of breath, negative for wheezing, positive for cough.   Gastrointestinal: negative for abdominal pain, negative for nausea, negative for vomiting, negative for diarrhea, negative for constipation, negative for blood in the stool, negative for black tarry stools.   Genitourinary: negative for burning on urination, negative for urinary urgency or frequency, negative for blood in the urine.   Endocrine: negative for cold intolerance, negative for heat intolerance, negative for increased thirst.   Hematologic: negative for easy bruising or bleeding.   Musculoskeletal: negative for muscle/joint pain, negative for decreased range of motion.   Neurological: negative for dizziness, negative for headaches, negative for loss of consciousness, negative for motor weakness, negative for sensory deficits.   Psychiatric: negative for depression, negative for anxiety.

## 2023-07-13 NOTE — PHYSICAL THERAPY INITIAL EVALUATION ADULT - LIVES WITH, PROFILE
resides with  sydnie in [facility]/spouse resides with  Rafael on 2nd floor of home above garage  in [facility]; dtr/family live downstairs/children/spouse

## 2023-07-13 NOTE — PROGRESS NOTE ADULT - ASSESSMENT
82 y/o F presented with shortness of breath     Acute CHF exacerbation   - Admit to telemetry   - BNP 3224, pt has no lower extremity swelling but pulmonary edema   - CTA: pulmonary edema, findings suggestive of cardiac dsyfunction   - Ordered ECHO   - Start lasix 40 mg IVP daily   - c/w home medications: beta blockers, ACE inhibitor, isosorbide mononitrate     - Strict I+Os, daily weights   - 2L H20 restriction, 2g sodium restriction      - Cardiology consult - Dr. Wei (per pt's request)     Elevated troponin likely demand ischemia r/o NSTEMI   New LBBB   - STAT dose of 325 mg of aspirin, c/w 81 mg of aspirin daily    - c/w home medications: statin, beta blocker, ACE inhibitor   - addn of plavix   - NPO FOR CARDIAC CATH LATER TODAY   - CTA NEG FOR PE  - monitor Creatinine closely   l   Dyselectrolytemia  - Keep K4, Mg2, replete prn     Scattered sub-4 mm pulmonary nodules and numerous nonspecific small mediastinal nodes up to 9 mm size  - f/u CT chest in 4-6 weeks     History of COVID, IBS, Cardiomyopathy, "leaky heart valves," HTN, HLD, [place], s/p cholecystectomy, s/p cataract surgery, s/p meniscus surgery, carotid artery stenosis (50% on left per pt)   - c/w home medications; verified with pt at the bedside   - , Na 146, monitor   - Ordered US carotid arteries to asses stenosis     Code status: Full code  Emergency contact: Amber Augustine (daughter) [phone]

## 2023-07-13 NOTE — H&P ADULT - NSHPPHYSICALEXAM_GEN_ALL_CORE
ICU Vital Signs Last 24 Hrs  T(C): 37 (12 Jul 2023 21:25), Max: 37 (12 Jul 2023 21:25)  T(F): 98.6 (12 Jul 2023 21:25), Max: 98.6 (12 Jul 2023 21:25)  HR: 88 (12 Jul 2023 21:25) (86 - 88)  BP: 142/57 (12 Jul 2023 21:25) (123/59 - 143/69)  RR: 18 (12 Jul 2023 21:25) (18 - 18)  SpO2: 99% (12 Jul 2023 21:25) (93% - 99%)    O2 Parameters below as of 12 Jul 2023 21:25  Patient On (Oxygen Delivery Method): room air    General: Awake and alert, cooperative with exam. No acute distress.   Skin: Warm, dry, and pink.   Eyes: Pupils equal and reactive to light. Extraocular eye movements intact. No conjunctival injection, discharge, or scleral icterus.   HEENT: Atraumatic, normocephalic. Moist mucus membranes.   Cardiology: Normal S1, S2. No murmurs, rubs, or gallops. Regular rate and rhythm.   Respiratory: Crackles at the bases bilaterally. Normal chest expansion.   Gastrointestinal: Positive bowel sounds. Soft, non-tender, non-distended. No guarding, rigidity, or rebound tenderness. No hepatosplenomegaly.   Musculoskeletal: 5/5 motor strength in all extremities. Normal range of motion.   Extremities: No peripheral edema bilaterally. Dorsalis pedis pulses 2+ bilaterally.   Neurological: A+Ox3 (person, place, and time). Cranial nerves 2-12 intact. Normal speech. No facial droop. No focal neurological deficits.   Psychiatric: Normal affect. Normal mood.

## 2023-07-13 NOTE — PHYSICAL THERAPY INITIAL EVALUATION ADULT - PERTINENT HX OF CURRENT PROBLEM, REHAB EVAL
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 Pt states that she got out of the pool to go to the bathroom. She felt a heaviness of her chest and was unable to catch her breath. The chest heaviness was located in the middle of her chest without radiation. She has been getting SOB on exertion recently mostly when she walks up 2 flights of stairs. Uses 1 pillow to sleep at night. Does not lay flat on her back. Reports having runny nose, cough, and swelling of her feet. Last ECHO was 1 year ago. She was following with her cardiologist for years but he no longer takes her insurance and she has not been for any recent testing. 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

## 2023-07-13 NOTE — DIETITIAN INITIAL EVALUATION ADULT - PERTINENT LABORATORY DATA
07-12    146<H>  |  113<H>  |  15  ----------------------------<  99  3.4<L>   |  29  |  0.56    Ca    8.8      12 Jul 2023 16:39    TPro  6.1  /  Alb  3.0<L>  /  TBili  0.5  /  DBili  x   /  AST  8<L>  /  ALT  12  /  AlkPhos  102  07-12

## 2023-07-13 NOTE — DIETITIAN INITIAL EVALUATION ADULT - ADD RECOMMEND
Advance to  regular diet to maximize nutritional and caloric intake when medically feasible  Record PO intake in EMR after each meal (nursing.)   MVI w/ minerals daily to ensure 100% RDA met   Consider adding thiamine 100 mg daily 2/2 poor PO intake/ malnutrition  Monitor bowel movements, if no BM for >3 days, consider implementing bowel regimen.  Confirm Goals of Care regarding nutrition support   Ensure plus 1x day  Monitor PO intake, tolerance, labs and weight.

## 2023-07-13 NOTE — H&P ADULT - NSHPSOCIALHISTORY_GEN_ALL_CORE
Lives with her daughter and . Independent with ADLs and IADLs. Smoked 2 ppd from age [age], quit 20 years ago. Denies alcohol or drug use.

## 2023-07-13 NOTE — DIETITIAN INITIAL EVALUATION ADULT - NAME AND PHONE
Ariela Li RDN, CDN, [facility]      [phone]   [email or web address]

## 2023-07-13 NOTE — H&P ADULT - ASSESSMENT
84 y/o F presented with     1. Acute CHF exacerbation   - Admit to telemetry   - BNP 3224, pt is fluid overloaded on exam   - CTA: pulmonary edema, findings suggestive of cardiac dsyfunction   - Recommend f/u CT chest in 4-6 weeks   - Ordered ECHO     2. Elevated troponin likely demand ischemia r/o NSTEMI   - Monitor on telemetry   - Troponin 23.39 -> 83.83     3. Hyokalemia   - K+ 3.4, will replete and f/u in AM     4. Elevated D-dimer   - D-dimer 239, CTA negative for PE     5. Scattered sub-4 mm pulmonary nodules and numerous nonspecific small mediastinal nodes up to 9 mm size  - f/u CT chest in 4-6 weeks     6. Hypoalbuminemia   - Albumin 3.0   - Nutrition consult     7. History of   - c/w home medications; verified with pt at the bedside   - , Na 146, monitor     DVT ppx:   Code status:   Emergency contact:  82 y/o F presented with shortness of breath     1. Acute CHF exacerbation   - Admit to telemetry   - BNP 3224, pt has no lower extremity swelling but pulmonary edema   - CTA: pulmonary edema, findings suggestive of cardiac dsyfunction   - Ordered ECHO   - Start lasix 40 mg IVP daily   - c/w home medications: beta blockers, ACE inhibitor, isosorbide mononitrate     - Strict I+Os, daily weights   - 2L H20 restriction, 2g sodium restriction      - Keep K > 4 and Mg > 2    - Cardiology consult - Dr. Wei (per pt's request)     2. Elevated troponin likely demand ischemia r/o NSTEMI   - Monitor on telemetry   - Troponin 23.39 -> 83.83, trend 1 more troponin   - STAT dose of 325 mg of aspirin, c/w 81 mg of aspirin daily    - c/w home medications: statin, beta blocker, ACE inhibitor   - Ordered ECHO, lipid panel   - Zofran and Maalox for nausea, Nitroglycerin PRN for chest pain   - If troponins continue to trend upwards, will order heparin  - Will likely need further workup, stres test vs. cath (placed NPO after midnight for now)     3. LBBB and trigeminy on EKG   - Will give Carvedilol dose now   - Unsure if LBBB is old or new -> pt does not meet [place] criteria   - Please try and f/u prior EKGs from pt's outpt physicians     4. Hypokalemia   - K+ 3.4, will replete and f/u in AM     5. Elevated D-dimer   - D-dimer 239, CTA negative for PE     6. Scattered sub-4 mm pulmonary nodules and numerous nonspecific small mediastinal nodes up to 9 mm size  - f/u CT chest in 4-6 weeks     7. Hypoalbuminemia   - Albumin 3.0   - Nutrition consult     8. History of COVID, IBS, Cardiomyopathy, "leaky heart valves," HTN, HLD, [place], s/p cholecystectomy, s/p cataract surgery, s/p meniscus surgery, carotid artery stenosis (50% on left per pt)   - c/w home medications; verified with pt at the bedside   - , Na 146, monitor   - Ordered US carotid arteries to asses stenosis     DVT ppx: Lovenox 40 mg subcutaneous   Code status: Full code, pt will discuss further with her family   Emergency contact: Amber Augustine (daughter) [phone]     I spent a total of 80 minutes on the date of this encounter coordinating the patient's care. This includes reviewing prior documentation, results and imaging in addition to completing a full history and physical examination on the patient. Further tests, medications, and procedures have been ordered as indicated. Laboratory results and the plan of care were communicated to the patient and/or their family member. Supporting documentation was completed and added to the patient's chart.  82 y/o F presented with shortness of breath     1. Acute CHF exacerbation   - Admit to telemetry   - BNP 3224, pt has no lower extremity swelling but pulmonary edema   - CTA: pulmonary edema, findings suggestive of cardiac dsyfunction   - Ordered ECHO   - Start lasix 40 mg IVP daily   - c/w home medications: beta blockers, ACE inhibitor, isosorbide mononitrate     - Strict I+Os, daily weights   - 2L H20 restriction, 2g sodium restriction      - Keep K > 4 and Mg > 2    - Cardiology consult - Dr. Wei (per pt's request)     2. Elevated troponin likely demand ischemia r/o NSTEMI   - Monitor on telemetry   - Troponin 23.39 -> 83.83, trend 1 more troponin   - STAT dose of 325 mg of aspirin, c/w 81 mg of aspirin daily    - c/w home medications: statin, beta blocker, ACE inhibitor   - Ordered ECHO, lipid panel   - Zofran and Maalox for nausea, Nitroglycerin PRN for chest pain   - If troponins continue to trend upwards, will order heparin  - Will likely need further workup, stres test vs. cath (placed NPO after midnight for now)     3. LBBB and trigeminy on EKG   - Will give Carvedilol dose now   - Unsure if LBBB is old or new -> pt does not meet [place] criteria   - Please try and f/u prior EKGs from pt's outpt physicians     4. Hypokalemia   - K+ 3.4, will replete and f/u in AM     5. Elevated D-dimer   - D-dimer 239, CTA negative for PE     6. Scattered sub-4 mm pulmonary nodules and numerous nonspecific small mediastinal nodes up to 9 mm size  - f/u CT chest in 4-6 weeks     7. QTC prolongation   -  ms, f/u AM EKG   - Avoid QTC prolonging medications   - Check Mg and Phosphorous     8. Hypoalbuminemia   - Albumin 3.0   - Nutrition consult     9. History of COVID, IBS, Cardiomyopathy, "leaky heart valves," HTN, HLD, [place], s/p cholecystectomy, s/p cataract surgery, s/p meniscus surgery, carotid artery stenosis (50% on left per pt)   - c/w home medications; verified with pt at the bedside   - , Na 146, monitor   - Ordered US carotid arteries to asses stenosis     DVT ppx: Lovenox 40 mg subcutaneous   Code status: Full code, pt will discuss further with her family   Emergency contact: Amber Augustine (daughter) [phone]     I spent a total of 80 minutes on the date of this encounter coordinating the patient's care. This includes reviewing prior documentation, results and imaging in addition to completing a full history and physical examination on the patient. Further tests, medications, and procedures have been ordered as indicated. Laboratory results and the plan of care were communicated to the patient and/or their family member. Supporting documentation was completed and added to the patient's chart.

## 2023-07-13 NOTE — DIETITIAN INITIAL EVALUATION ADULT - OTHER INFO
84 y/o F with PMH COVID, IBS, Cardiomyopathy, "leaky heart valves," HTN, HLD, [place], 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 Pt states that she got out of the pool to go to the bathroom. She felt a heaviness of her chest and was unable to catch her breath. The chest heaviness was located in the middle of her chest without radiation. She has been getting SOB on exertion recently mostly when she walks up 2 flights of stairs. Uses 1 pillow to sleep at night. Does not lay flat on her back. Reports having runny nose, cough, and swelling of her feet. Last ECHO was 1 year ago. She was following with her cardiologist for years but he no longer takes her insurance and she has not been for any recent testing. 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 Denies headaches, blurry vision, dizziness, fevers, chills, abdominal pain, N/V, diarrhea/constipation.   82 y/o F with PMH COVID, IBS, Cardiomyopathy, "leaky heart valves," HTN, HLD, [place], 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 Pt states that she got out of the pool to go to the bathroom. She felt a heaviness of her chest and was unable to catch her breath. The chest heaviness was located in the middle of her chest without radiation. She has been getting SOB on exertion recently mostly when she walks up 2 flights of stairs. Uses 1 pillow to sleep at night. Does not lay flat on her back. Reports having runny nose, cough, and swelling of her feet. Last ECHO was 1 year ago. She was following with her cardiologist for years but he no longer takes her insurance and she has not been for any recent testing. 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 Denies headaches, blurry vision, dizziness, fevers, chills, abdominal pain, N/V, diarrhea/constipation.      Admit dx is CHF  RD bedscale wt   57 kg   125#  NFPE reveals muscle wasting, fat wasting   PO intake estimated < 75% ENN > one month   Pt educated on CHF and DASH diet  Confirm Goals of Care regarding nutrition support   Recommendations to follow in Plan/Intervention

## 2023-07-14 PROBLEM — E78.5 HYPERLIPIDEMIA, UNSPECIFIED: Chronic | Status: ACTIVE | Noted: 2023-07-13

## 2023-07-14 PROBLEM — Z87.19 PERSONAL HISTORY OF OTHER DISEASES OF THE DIGESTIVE SYSTEM: Chronic | Status: ACTIVE | Noted: 2023-07-13

## 2023-07-14 PROBLEM — H91.90 UNSPECIFIED HEARING LOSS, UNSPECIFIED EAR: Chronic | Status: ACTIVE | Noted: 2023-07-13

## 2023-07-14 PROBLEM — Z86.79 PERSONAL HISTORY OF OTHER DISEASES OF THE CIRCULATORY SYSTEM: Chronic | Status: ACTIVE | Noted: 2023-07-13

## 2023-07-14 PROBLEM — U07.1 COVID-19: Chronic | Status: ACTIVE | Noted: 2023-07-13

## 2023-07-14 PROBLEM — I10 ESSENTIAL (PRIMARY) HYPERTENSION: Chronic | Status: ACTIVE | Noted: 2023-07-13

## 2023-07-14 LAB
ANION GAP SERPL CALC-SCNC: 5 MMOL/L — SIGNIFICANT CHANGE UP (ref 5–17)
BUN SERPL-MCNC: 25 MG/DL — HIGH (ref 7–23)
CALCIUM SERPL-MCNC: 9.1 MG/DL — SIGNIFICANT CHANGE UP (ref 8.5–10.1)
CHLORIDE SERPL-SCNC: 110 MMOL/L — HIGH (ref 96–108)
CO2 SERPL-SCNC: 29 MMOL/L — SIGNIFICANT CHANGE UP (ref 22–31)
CREAT SERPL-MCNC: 0.65 MG/DL — SIGNIFICANT CHANGE UP (ref 0.5–1.3)
EGFR: 87 ML/MIN/1.73M2 — SIGNIFICANT CHANGE UP
GLUCOSE SERPL-MCNC: 100 MG/DL — HIGH (ref 70–99)
HCT VFR BLD CALC: 37.9 % — SIGNIFICANT CHANGE UP (ref 34.5–45)
HGB BLD-MCNC: 12.6 G/DL — SIGNIFICANT CHANGE UP (ref 11.5–15.5)
MAGNESIUM SERPL-MCNC: 2.1 MG/DL — SIGNIFICANT CHANGE UP (ref 1.6–2.6)
MCHC RBC-ENTMCNC: 29.2 PG — SIGNIFICANT CHANGE UP (ref 27–34)
MCHC RBC-ENTMCNC: 33.2 GM/DL — SIGNIFICANT CHANGE UP (ref 32–36)
MCV RBC AUTO: 87.9 FL — SIGNIFICANT CHANGE UP (ref 80–100)
PHOSPHATE SERPL-MCNC: 3.6 MG/DL — SIGNIFICANT CHANGE UP (ref 2.5–4.5)
PLATELET # BLD AUTO: 137 K/UL — LOW (ref 150–400)
POTASSIUM SERPL-MCNC: 3.4 MMOL/L — LOW (ref 3.5–5.3)
POTASSIUM SERPL-SCNC: 3.4 MMOL/L — LOW (ref 3.5–5.3)
RBC # BLD: 4.31 M/UL — SIGNIFICANT CHANGE UP (ref 3.8–5.2)
RBC # FLD: 13.2 % — SIGNIFICANT CHANGE UP (ref 10.3–14.5)
SODIUM SERPL-SCNC: 144 MMOL/L — SIGNIFICANT CHANGE UP (ref 135–145)
TROPONIN I, HIGH SENSITIVITY RESULT: 31.63 NG/L — SIGNIFICANT CHANGE UP
WBC # BLD: 7.63 K/UL — SIGNIFICANT CHANGE UP (ref 3.8–10.5)
WBC # FLD AUTO: 7.63 K/UL — SIGNIFICANT CHANGE UP (ref 3.8–10.5)

## 2023-07-14 PROCEDURE — 93010 ELECTROCARDIOGRAM REPORT: CPT

## 2023-07-14 PROCEDURE — 99232 SBSQ HOSP IP/OBS MODERATE 35: CPT

## 2023-07-14 RX ORDER — METOPROLOL TARTRATE 50 MG
50 TABLET ORAL
Refills: 0 | Status: DISCONTINUED | OUTPATIENT
Start: 2023-07-14 | End: 2023-07-15

## 2023-07-14 RX ORDER — SACUBITRIL AND VALSARTAN 24; 26 MG/1; MG/1
1 TABLET, FILM COATED ORAL
Refills: 0 | Status: DISCONTINUED | OUTPATIENT
Start: 2023-07-15 | End: 2023-07-15

## 2023-07-14 RX ORDER — CARVEDILOL PHOSPHATE 80 MG/1
25 CAPSULE, EXTENDED RELEASE ORAL EVERY 12 HOURS
Refills: 0 | Status: DISCONTINUED | OUTPATIENT
Start: 2023-07-14 | End: 2023-07-14

## 2023-07-14 RX ORDER — POTASSIUM CHLORIDE 20 MEQ
40 PACKET (EA) ORAL EVERY 4 HOURS
Refills: 0 | Status: COMPLETED | OUTPATIENT
Start: 2023-07-14 | End: 2023-07-14

## 2023-07-14 RX ADMIN — Medication 1 TABLET(S): at 12:39

## 2023-07-14 RX ADMIN — Medication 50 MILLIGRAM(S): at 21:44

## 2023-07-14 RX ADMIN — ATORVASTATIN CALCIUM 40 MILLIGRAM(S): 80 TABLET, FILM COATED ORAL at 21:44

## 2023-07-14 RX ADMIN — Medication 1 TABLET(S): at 18:26

## 2023-07-14 RX ADMIN — PREGABALIN 1000 MICROGRAM(S): 225 CAPSULE ORAL at 12:40

## 2023-07-14 RX ADMIN — Medication 40 MILLIEQUIVALENT(S): at 18:54

## 2023-07-14 RX ADMIN — Medication 2 GRAM(S): at 12:40

## 2023-07-14 RX ADMIN — Medication 1000 UNIT(S): at 12:40

## 2023-07-14 RX ADMIN — Medication 2 GRAM(S): at 21:44

## 2023-07-14 RX ADMIN — Medication 81 MILLIGRAM(S): at 05:20

## 2023-07-14 RX ADMIN — CLOPIDOGREL BISULFATE 75 MILLIGRAM(S): 75 TABLET, FILM COATED ORAL at 05:21

## 2023-07-14 RX ADMIN — ZINC SULFATE TAB 220 MG (50 MG ZINC EQUIVALENT) 220 MILLIGRAM(S): 220 (50 ZN) TAB at 12:40

## 2023-07-14 RX ADMIN — Medication 1 TABLET(S): at 12:40

## 2023-07-14 RX ADMIN — Medication 40 MILLIEQUIVALENT(S): at 21:44

## 2023-07-14 RX ADMIN — Medication 400 INTERNATIONAL UNIT(S): at 12:40

## 2023-07-14 NOTE — PROGRESS NOTE ADULT - ASSESSMENT
82 y/o F with PMH COVID, IBS, Cardiomyopathy, "leaky heart valves," HTN, HLD, [place], 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     #Chest Tightness, NSTEMI.  R/o ACS.   Monitor on tele. SR, PVCs, no events thus far  Troponin minimally elevated but downtrended  ECG - LBBB (appears new, records obtained from prior cardiology office)  Echo pending official review, prelim EF 30-35%  Continue Aspirin, add Plavix, Coreg, Statin   IV Lasix daily (held for now), check LVEDP during cath  Hold HCTZ and Imdur  Plan for cardiac angiogram for today.    #hx Cardiomyopathy  Appears last Cardiac Cath in 2015, normal coronaries  - Continue with Coreg, DCedisinopril -  transition to Entresto in AM, eventually introduce Farxiga    #HTN. Continue current medications for now    #HLD. Continue statin. Check lipid panel.    #Carotid Stenosis. C/w medical management . On aspirin and statin.        Case d/w Dr. Wang     84 y/o F with PMH COVID, IBS, Cardiomyopathy, "leaky heart valves," HTN, HLD, [place], 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     #Chest Tightness, NSTEMI.  R/o ACS.   Monitor on tele. SR, PVCs, no events thus far  Troponin minimally elevated but downtrended  ECG - LBBB (appears new, records obtained from prior cardiology office)  Echo pending official review, prelim EF 30-35%  Continue Aspirin, add Plavix, Coreg, Statin   IV Lasix daily (held for now), check LVEDP during cath  Hold HCTZ and Imdur  Plan for cardiac angiogram for today.    #hx Cardiomyopathy  #PVCs, high burden. Switch Coreg to Toprol 50mg BID.  Appears last Cardiac Cath in 2015, normal coronaries  - Continue with Metoprolol, DCed lisinopril  -  transition to Entresto in AM, Recommended Farxiga started on DC 10mg daily.  - ?arrythmia induced cardiomyopathy - EP following, consider monitor prior to pt DC for PVC burden eval.    #HTN. Continue current medications for now    #HLD. Continue statin. Check lipid panel.    #Carotid Stenosis. C/w medical management . On aspirin and statin.        Case d/w Dr. Wang

## 2023-07-14 NOTE — PROGRESS NOTE ADULT - SUBJECTIVE AND OBJECTIVE BOX
HPI:  84 y/o F with PMH HTN, HLD, NICM, Mild mitral regurgitation, bilateral moderate ICA stenosis, IBS. Admitted to telemetry unit for new onset SOB. As per patient she was in the [place] wit family when she experienced sudden onset of retrosternal chest pain assocated to significant dyspnea. On admission patient presenting with SOB not hypoxic. Imaging revealed pulmonary edema, patient received Lasix 20 mg with significant symptomatic improvement.     ER course: SpO2 93-97% on room air. Labs: , D-dimer 239, troponin 23.39 -> 83.83. Na 146, K+ 3.4, albumin 3.0, BNP 3224. 1st EKG: NSR with PVCs HR 85 bpm, normal intervals, , LBBB (pt is unsure if this is old or new), does not meet Sgarbossa criteria, no ST changes (personally reviewed).  2nd EKG: unchanged from prior -> NSR with frequent PVCs HR 81 bpm,  ms, does not meet Sgarbossa criteria, LBBB, trigeminy (personally reviewed).     Imaging:   - CXR: increased vascular congestion bilaterally, no consolidation, no pneumothorax, no effusion (personally reviewed).   - CTA: No pulmonary embolus. Findings of pulmonary edema including interlobular septal thickening, groundglass, and trace pleural effusions. Follow to resolution with repeat chest CT in one month, or sooner as clinically warranted. Additional findings suggestive of cardiac dysfunction. Recommend echocardiogram. Coronary artery calcifications. Mild emphysematous changes. Scattered sub-4 mm pulmonary nodules and numerous nonspecific small mediastinal nodes up to 9 mm size can be reevaluated on subsequent chest CT.    Pt was given 20 mg IVP lasix. She is being admitted to telemetry for further management.    (13 Jul 2023 01:02)    83y Female    PAST MEDICAL & SURGICAL HISTORY:  2019 novel coronavirus disease (COVID-19)      History of IBS      H/O cardiomyopathy      History of heart valve abnormality      HTN (hypertension)      HLD (hyperlipidemia)      [place] (hard of hearing)      H/O carotid artery stenosis      S/P cataract surgery      S/P cholecystectomy      H/O knee surgery          MEDICATIONS  (STANDING):  aspirin  chewable 81 milliGRAM(s) Oral daily  atorvastatin 40 milliGRAM(s) Oral at bedtime  carvedilol 12.5 milliGRAM(s) Oral every 12 hours  cholecalciferol 1000 Unit(s) Oral daily  clopidogrel Tablet 75 milliGRAM(s) Oral daily  cyanocobalamin 1000 MICROGram(s) Oral daily  enoxaparin Injectable 40 milliGRAM(s) SubCutaneous every 24 hours  lactobacillus acidophilus 1 Tablet(s) Oral two times a day with meals  multivitamin 1 Tablet(s) Oral daily  omega-3-Acid Ethyl Esters 2 Gram(s) Oral two times a day  vitamin E 400 International Unit(s) Oral daily  zinc sulfate 220 milliGRAM(s) Oral daily    MEDICATIONS  (PRN):  acetaminophen     Tablet .. 650 milliGRAM(s) Oral every 6 hours PRN Temp greater or equal to 38C (100.4F), Mild Pain (1 - 3)  melatonin 3 milliGRAM(s) Oral at bedtime PRN Insomnia  nitroglycerin     SubLingual 0.4 milliGRAM(s) SubLingual every 5 minutes PRN Chest Pain      Allergies    ondansetron (Unknown)  strawberry (Unknown)  sulfa drugs (Unknown)  codeine (Unknown)    Intolerances        SOCIAL HISTORY: Denies tobacco, etoh abuse or illicit drug use    FAMILY HISTORY:  Family history of CVA (Father)        Vital Signs Last 24 Hrs  T(C): 36.2 (14 Jul 2023 08:27), Max: 36.8 (13 Jul 2023 21:01)  T(F): 97.1 (14 Jul 2023 08:27), Max: 98.3 (13 Jul 2023 21:01)  HR: 78 (14 Jul 2023 10:25) (74 - 83)  BP: 151/77 (14 Jul 2023 10:25) (110/84 - 151/77)  BP(mean): --  RR: 16 (14 Jul 2023 10:25) (16 - 18)  SpO2: 95% (14 Jul 2023 10:25) (95% - 99%)    Parameters below as of 14 Jul 2023 10:25  Patient On (Oxygen Delivery Method): room air        REVIEW OF SYSTEMS:    CONSTITUTIONAL:  As per HPI.  HEENT:  Eyes:  No diplopia or blurred vision. ENT:  No earache, sore throat or runny nose.  CARDIOVASCULAR:  No pressure, squeezing, strangling, tightness, heaviness or aching about the chest, neck, axilla or epigastrium.  RESPIRATORY:  No cough, shortness of breath, PND or orthopnea.  GASTROINTESTINAL:  No nausea, vomiting or diarrhea.  GENITOURINARY:  No dysuria, frequency or urgency.  MUSCULOSKELETAL:  As per HPI.  SKIN:  No change in skin, hair or nails.  NEUROLOGIC:  No paresthesias, fasciculations, seizures or weakness.  PSYCHIATRIC:  No disorder of thought or mood.  ENDOCRINE:  No heat or cold intolerance, polyuria or polydipsia.  HEMATOLOGICAL:  No easy bruising or bleedings:  .     PHYSICAL EXAMINATION:    GENERAL APPEARANCE:  Pt. is not currently dyspneic, in no distress. Pt. is alert, oriented, and pleasant.  HEENT:  Pupils are normal and react normally. No icterus. Mucous membranes well colored.  NECK:  Supple. No lymphadenopathy. Jugular venous pressure not elevated. Carotids equal.   HEART:   The cardiac impulse has a normal quality. There are no murmurs, rubs or gallops noted  CHEST:  Chest is clear to auscultation. Normal respiratory effort.  ABDOMEN:  Soft and nontender.   EXTREMITIES:  There is no edema.   SKIN:  No rash or significant lesions are noted.    I&O's Summary      LABS:                        12.6   7.63  )-----------( 137      ( 14 Jul 2023 07:24 )             37.9     07-14    144  |  110<H>  |  25<H>  ----------------------------<  100<H>  3.4<L>   |  29  |  0.65    Ca    9.1      14 Jul 2023 07:24  Phos  3.6     07-14  Mg     2.1     07-14    TPro  6.1  /  Alb  3.0<L>  /  TBili  0.5  /  DBili  x   /  AST  8<L>  /  ALT  12  /  AlkPhos  102  07-12    LIVER FUNCTIONS - ( 12 Jul 2023 16:39 )  Alb: 3.0 g/dL / Pro: 6.1 gm/dL / ALK PHOS: 102 U/L / ALT: 12 U/L / AST: 8 U/L / GGT: x           PT/INR - ( 13 Jul 2023 07:02 )   PT: 12.8 sec;   INR: 1.10 ratio         PTT - ( 12 Jul 2023 16:39 )  PTT:28.2 sec      Urinalysis Basic - ( 14 Jul 2023 07:24 )    Color: x / Appearance: x / SG: x / pH: x  Gluc: 100 mg/dL / Ketone: x  / Bili: x / Urobili: x   Blood: x / Protein: x / Nitrite: x   Leuk Esterase: x / RBC: x / WBC x   Sq Epi: x / Non Sq Epi: x / Bacteria: x          EKG: Trigeminy, LBBB, QRS: 125, QTc: 543    TELEMETRY: Trigeminy and multiple unifocal PVCs    CARDIAC TESTS:  ECHO (7/13/23): EF: 30-35%, prelim read  CATH (7/14/23):     RADIOLOGY & ADDITIONAL STUDIES:  - CXR: increased vascular congestion bilaterally, no consolidation, no pneumothorax, no effusion   - CTA: No pulmonary embolus. Findings of pulmonary edema including interlobular septal thickening, groundglass, and trace pleural effusions. Follow to resolution with repeat chest CT in one month, or sooner as clinically warranted. Additional findings suggestive of cardiac dysfunction. Recommend echocardiogram. Coronary artery calcifications. Mild emphysematous changes. Scattered sub-4 mm pulmonary nodules and numerous nonspecific small mediastinal nodes up to 9 mm size can be reevaluated on subsequent chest CT. HPI:  84 y/o F with PMH HTN, HLD, NICM, Mild mitral regurgitation, bilateral moderate ICA stenosis, IBS. Admitted to telemetry unit for new onset SOB. As per patient she was in the [place] wit family when she experienced sudden onset of retrosternal chest pain assocated to significant dyspnea. On admission patient presenting with SOB not hypoxic. Imaging revealed pulmonary edema, patient received Lasix 20 mg with significant symptomatic improvement.     ER course: SpO2 93-97% on room air. Labs: , D-dimer 239, troponin 23.39 -> 83.83. Na 146, K+ 3.4, albumin 3.0, BNP 3224. 1st EKG: NSR with PVCs HR 85 bpm, normal intervals, , LBBB (pt is unsure if this is old or new), does not meet Sgarbossa criteria, no ST changes (personally reviewed).  2nd EKG: unchanged from prior -> NSR with frequent PVCs HR 81 bpm,  ms, does not meet Sgarbossa criteria, LBBB, trigeminy (personally reviewed).     Imaging:   - CXR: increased vascular congestion bilaterally, no consolidation, no pneumothorax, no effusion (personally reviewed).   - CTA: No pulmonary embolus. Findings of pulmonary edema including interlobular septal thickening, groundglass, and trace pleural effusions. Follow to resolution with repeat chest CT in one month, or sooner as clinically warranted. Additional findings suggestive of cardiac dysfunction. Recommend echocardiogram. Coronary artery calcifications. Mild emphysematous changes. Scattered sub-4 mm pulmonary nodules and numerous nonspecific small mediastinal nodes up to 9 mm size can be reevaluated on subsequent chest CT.    Pt was given 20 mg IVP lasix. She is being admitted to telemetry for further management.    (13 Jul 2023 01:02)    83y Female    PAST MEDICAL & SURGICAL HISTORY:  2019 novel coronavirus disease (COVID-19)      History of IBS      H/O cardiomyopathy      History of heart valve abnormality      HTN (hypertension)      HLD (hyperlipidemia)      [place] (hard of hearing)      H/O carotid artery stenosis      S/P cataract surgery      S/P cholecystectomy      H/O knee surgery          MEDICATIONS  (STANDING):  aspirin  chewable 81 milliGRAM(s) Oral daily  atorvastatin 40 milliGRAM(s) Oral at bedtime  carvedilol 12.5 milliGRAM(s) Oral every 12 hours  cholecalciferol 1000 Unit(s) Oral daily  clopidogrel Tablet 75 milliGRAM(s) Oral daily  cyanocobalamin 1000 MICROGram(s) Oral daily  enoxaparin Injectable 40 milliGRAM(s) SubCutaneous every 24 hours  lactobacillus acidophilus 1 Tablet(s) Oral two times a day with meals  multivitamin 1 Tablet(s) Oral daily  omega-3-Acid Ethyl Esters 2 Gram(s) Oral two times a day  vitamin E 400 International Unit(s) Oral daily  zinc sulfate 220 milliGRAM(s) Oral daily    MEDICATIONS  (PRN):  acetaminophen     Tablet .. 650 milliGRAM(s) Oral every 6 hours PRN Temp greater or equal to 38C (100.4F), Mild Pain (1 - 3)  melatonin 3 milliGRAM(s) Oral at bedtime PRN Insomnia  nitroglycerin     SubLingual 0.4 milliGRAM(s) SubLingual every 5 minutes PRN Chest Pain      Allergies    ondansetron (Unknown)  strawberry (Unknown)  sulfa drugs (Unknown)  codeine (Unknown)    Intolerances        SOCIAL HISTORY: Denies tobacco, etoh abuse or illicit drug use    FAMILY HISTORY:  Family history of CVA (Father)        Vital Signs Last 24 Hrs  T(C): 36.2 (14 Jul 2023 08:27), Max: 36.8 (13 Jul 2023 21:01)  T(F): 97.1 (14 Jul 2023 08:27), Max: 98.3 (13 Jul 2023 21:01)  HR: 78 (14 Jul 2023 10:25) (74 - 83)  BP: 151/77 (14 Jul 2023 10:25) (110/84 - 151/77)  BP(mean): --  RR: 16 (14 Jul 2023 10:25) (16 - 18)  SpO2: 95% (14 Jul 2023 10:25) (95% - 99%)    Parameters below as of 14 Jul 2023 10:25  Patient On (Oxygen Delivery Method): room air        REVIEW OF SYSTEMS:    CONSTITUTIONAL:  As per HPI.  HEENT:  Eyes:  No diplopia or blurred vision. ENT:  No earache, sore throat or runny nose.  CARDIOVASCULAR:  No pressure, squeezing, strangling, tightness, heaviness or aching about the chest, neck, axilla or epigastrium.  RESPIRATORY:  No cough, shortness of breath, PND or orthopnea.  GASTROINTESTINAL:  No nausea, vomiting or diarrhea.  GENITOURINARY:  No dysuria, frequency or urgency.  MUSCULOSKELETAL:  As per HPI.  SKIN:  No change in skin, hair or nails.  NEUROLOGIC:  No paresthesias, fasciculations, seizures or weakness.  PSYCHIATRIC:  No disorder of thought or mood.  ENDOCRINE:  No heat or cold intolerance, polyuria or polydipsia.  HEMATOLOGICAL:  No easy bruising or bleedings:  .     PHYSICAL EXAMINATION:    GENERAL APPEARANCE:  Pt. is not currently dyspneic, in no distress. Pt. is alert, oriented, and pleasant.  HEENT:  Pupils are normal and react normally. No icterus. Mucous membranes well colored.  NECK:  Supple. No lymphadenopathy. Jugular venous pressure not elevated. Carotids equal.   HEART:   The cardiac impulse has a normal quality. There are no murmurs, rubs or gallops noted  CHEST:  Chest is clear to auscultation. Normal respiratory effort.  ABDOMEN:  Soft and nontender.   EXTREMITIES:  There is no edema.   SKIN:  No rash or significant lesions are noted.    I&O's Summary      LABS:                        12.6   7.63  )-----------( 137      ( 14 Jul 2023 07:24 )             37.9     07-14    144  |  110<H>  |  25<H>  ----------------------------<  100<H>  3.4<L>   |  29  |  0.65    Ca    9.1      14 Jul 2023 07:24  Phos  3.6     07-14  Mg     2.1     07-14    TPro  6.1  /  Alb  3.0<L>  /  TBili  0.5  /  DBili  x   /  AST  8<L>  /  ALT  12  /  AlkPhos  102  07-12    LIVER FUNCTIONS - ( 12 Jul 2023 16:39 )  Alb: 3.0 g/dL / Pro: 6.1 gm/dL / ALK PHOS: 102 U/L / ALT: 12 U/L / AST: 8 U/L / GGT: x           PT/INR - ( 13 Jul 2023 07:02 )   PT: 12.8 sec;   INR: 1.10 ratio         PTT - ( 12 Jul 2023 16:39 )  PTT:28.2 sec      Urinalysis Basic - ( 14 Jul 2023 07:24 )    Color: x / Appearance: x / SG: x / pH: x  Gluc: 100 mg/dL / Ketone: x  / Bili: x / Urobili: x   Blood: x / Protein: x / Nitrite: x   Leuk Esterase: x / RBC: x / WBC x   Sq Epi: x / Non Sq Epi: x / Bacteria: x          EKG: Trigeminy, LBBB, QRS: 125, QTc: 543    TELEMETRY: Trigeminy and multiple unifocal PVCs    CARDIAC TESTS:  ECHO (7/13/23): EF: 30-35%, prelim read  CATH (7/14/23):     ASSESSMENT AND PLAN    Thank-you for letting the EP Service  participate in the care of your patient.   RADIOLOGY & ADDITIONAL STUDIES:  - CXR: increased vascular congestion bilaterally, no consolidation, no pneumothorax, no effusion   - CTA: No pulmonary embolus. Findings of pulmonary edema including interlobular septal thickening, groundglass, and trace pleural effusions. Follow to resolution with repeat chest CT in one month, or sooner as clinically warranted. Additional findings suggestive of cardiac dysfunction. Recommend echocardiogram. Coronary artery calcifications. Mild emphysematous changes. Scattered sub-4 mm pulmonary nodules and numerous nonspecific small mediastinal nodes up to 9 mm size can be reevaluated on subsequent chest CT. HPI:  84 y/o F with PMH HTN, HLD, NICM, Mild mitral regurgitation, bilateral moderate ICA stenosis, IBS. Admitted to telemetry unit for new onset SOB. As per patient she was in the [place] wit family when she experienced sudden onset of retrosternal chest pain assocated to significant dyspnea. On admission patient presenting with SOB not hypoxic. Imaging revealed pulmonary edema, patient received Lasix 20 mg with significant symptomatic improvement.     ER course: SpO2 93-97% on room air. Labs: , D-dimer 239, troponin 23.39 -> 83.83. Na 146, K+ 3.4, albumin 3.0, BNP 3224. 1st EKG: NSR with PVCs HR 85 bpm, normal intervals, , LBBB (pt is unsure if this is old or new), does not meet Sgarbossa criteria, no ST changes (personally reviewed).  2nd EKG: unchanged from prior -> NSR with frequent PVCs HR 81 bpm,  ms, does not meet Sgarbossa criteria, LBBB, trigeminy (personally reviewed).     Imaging:   - CXR: increased vascular congestion bilaterally, no consolidation, no pneumothorax, no effusion (personally reviewed).   - CTA: No pulmonary embolus. Findings of pulmonary edema including interlobular septal thickening, groundglass, and trace pleural effusions. Follow to resolution with repeat chest CT in one month, or sooner as clinically warranted. Additional findings suggestive of cardiac dysfunction. Recommend echocardiogram. Coronary artery calcifications. Mild emphysematous changes. Scattered sub-4 mm pulmonary nodules and numerous nonspecific small mediastinal nodes up to 9 mm size can be reevaluated on subsequent chest CT.    Pt was given 20 mg IVP lasix. She is being admitted to telemetry for further management.    (13 Jul 2023 01:02)    83y Female    PAST MEDICAL & SURGICAL HISTORY:  2019 novel coronavirus disease (COVID-19)      History of IBS      H/O cardiomyopathy      History of heart valve abnormality      HTN (hypertension)      HLD (hyperlipidemia)      [place] (hard of hearing)      H/O carotid artery stenosis      S/P cataract surgery      S/P cholecystectomy      H/O knee surgery          MEDICATIONS  (STANDING):  aspirin  chewable 81 milliGRAM(s) Oral daily  atorvastatin 40 milliGRAM(s) Oral at bedtime  carvedilol 12.5 milliGRAM(s) Oral every 12 hours  cholecalciferol 1000 Unit(s) Oral daily  clopidogrel Tablet 75 milliGRAM(s) Oral daily  cyanocobalamin 1000 MICROGram(s) Oral daily  enoxaparin Injectable 40 milliGRAM(s) SubCutaneous every 24 hours  lactobacillus acidophilus 1 Tablet(s) Oral two times a day with meals  multivitamin 1 Tablet(s) Oral daily  omega-3-Acid Ethyl Esters 2 Gram(s) Oral two times a day  vitamin E 400 International Unit(s) Oral daily  zinc sulfate 220 milliGRAM(s) Oral daily    MEDICATIONS  (PRN):  acetaminophen     Tablet .. 650 milliGRAM(s) Oral every 6 hours PRN Temp greater or equal to 38C (100.4F), Mild Pain (1 - 3)  melatonin 3 milliGRAM(s) Oral at bedtime PRN Insomnia  nitroglycerin     SubLingual 0.4 milliGRAM(s) SubLingual every 5 minutes PRN Chest Pain      Allergies    ondansetron (Unknown)  strawberry (Unknown)  sulfa drugs (Unknown)  codeine (Unknown)    Intolerances        SOCIAL HISTORY: Denies tobacco, etoh abuse or illicit drug use    FAMILY HISTORY:  Family history of CVA (Father)        Vital Signs Last 24 Hrs  T(C): 36.2 (14 Jul 2023 08:27), Max: 36.8 (13 Jul 2023 21:01)  T(F): 97.1 (14 Jul 2023 08:27), Max: 98.3 (13 Jul 2023 21:01)  HR: 78 (14 Jul 2023 10:25) (74 - 83)  BP: 151/77 (14 Jul 2023 10:25) (110/84 - 151/77)  BP(mean): --  RR: 16 (14 Jul 2023 10:25) (16 - 18)  SpO2: 95% (14 Jul 2023 10:25) (95% - 99%)    Parameters below as of 14 Jul 2023 10:25  Patient On (Oxygen Delivery Method): room air        REVIEW OF SYSTEMS:    CONSTITUTIONAL:  As per HPI.  HEENT:  Eyes:  No diplopia or blurred vision. ENT:  No earache, sore throat or runny nose.  CARDIOVASCULAR:  No pressure, squeezing, strangling, tightness, heaviness or aching about the chest, neck, axilla or epigastrium.  RESPIRATORY:  No cough, shortness of breath, PND or orthopnea.  GASTROINTESTINAL:  No nausea, vomiting or diarrhea.  GENITOURINARY:  No dysuria, frequency or urgency.  MUSCULOSKELETAL:  As per HPI.  SKIN:  No change in skin, hair or nails.  NEUROLOGIC:  No paresthesias, fasciculations, seizures or weakness.  PSYCHIATRIC:  No disorder of thought or mood.  ENDOCRINE:  No heat or cold intolerance, polyuria or polydipsia.  HEMATOLOGICAL:  No easy bruising or bleedings:  .     PHYSICAL EXAMINATION:    GENERAL APPEARANCE:  Pt. is not currently dyspneic, in no distress. Pt. is alert, oriented, and pleasant.  HEENT:  Pupils are normal and react normally. No icterus. Mucous membranes well colored.  NECK:  Supple. No lymphadenopathy. Jugular venous pressure not elevated. Carotids equal.   HEART:   The cardiac impulse has a normal quality. There are no murmurs, rubs or gallops noted  CHEST:  Chest is clear to auscultation. Normal respiratory effort.  ABDOMEN:  Soft and nontender.   EXTREMITIES:  There is no edema.   SKIN:  No rash or significant lesions are noted.    I&O's Summary      LABS:                        12.6   7.63  )-----------( 137      ( 14 Jul 2023 07:24 )             37.9     07-14    144  |  110<H>  |  25<H>  ----------------------------<  100<H>  3.4<L>   |  29  |  0.65    Ca    9.1      14 Jul 2023 07:24  Phos  3.6     07-14  Mg     2.1     07-14    TPro  6.1  /  Alb  3.0<L>  /  TBili  0.5  /  DBili  x   /  AST  8<L>  /  ALT  12  /  AlkPhos  102  07-12    LIVER FUNCTIONS - ( 12 Jul 2023 16:39 )  Alb: 3.0 g/dL / Pro: 6.1 gm/dL / ALK PHOS: 102 U/L / ALT: 12 U/L / AST: 8 U/L / GGT: x           PT/INR - ( 13 Jul 2023 07:02 )   PT: 12.8 sec;   INR: 1.10 ratio         PTT - ( 12 Jul 2023 16:39 )  PTT:28.2 sec      Urinalysis Basic - ( 14 Jul 2023 07:24 )    Color: x / Appearance: x / SG: x / pH: x  Gluc: 100 mg/dL / Ketone: x  / Bili: x / Urobili: x   Blood: x / Protein: x / Nitrite: x   Leuk Esterase: x / RBC: x / WBC x   Sq Epi: x / Non Sq Epi: x / Bacteria: x          EKG: Trigeminy, LBBB, QRS: 125, QTc: 543    TELEMETRY: Trigeminy and multiple unifocal PVCs    CARDIAC TESTS:  ECHO (7/13/23): EF: 30-35%, prelim read  CATH (7/14/23): Non-obstructive disease    ASSESSMENT AND PLAN    Thank-you for letting the EP Service  participate in the care of your patient.   RADIOLOGY & ADDITIONAL STUDIES:  - CXR: increased vascular congestion bilaterally, no consolidation, no pneumothorax, no effusion   - CTA: No pulmonary embolus. Findings of pulmonary edema including interlobular septal thickening, groundglass, and trace pleural effusions. Follow to resolution with repeat chest CT in one month, or sooner as clinically warranted. Additional findings suggestive of cardiac dysfunction. Recommend echocardiogram. Coronary artery calcifications. Mild emphysematous changes. Scattered sub-4 mm pulmonary nodules and numerous nonspecific small mediastinal nodes up to 9 mm size can be reevaluated on subsequent chest CT.

## 2023-07-14 NOTE — CHART NOTE - NSCHARTNOTEFT_GEN_A_CORE
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 Pt states that she got out of the pool to go to the bathroom. She felt a heaviness of her chest and was unable to catch her breath. The chest heaviness was located in the middle of her chest without radiation. She has been getting SOB on exertion recently mostly when she walks up 2 flights of stairs. Uses 1 pillow to sleep at night. Does not lay flat on her back. Reports having runny nose, cough, and swelling of her feet. Last ECHO was 1 year ago. She was following with her cardiologist for years but he no longer takes her insurance and she has not been for any recent testing. 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 Denies headaches, blurry vision, dizziness, fevers, chills, abdominal pain, N/V, diarrhea/constipation.      ER course: SpO2 93-97% on room air. Labs: , D-dimer 239, troponin 23.39 -> 83.83. Na 146, K+ 3.4, albumin 3.0, BNP 3224. 1st EKG: NSR with PVCs HR 85 bpm, normal intervals, , LBBB (pt is unsure if this is old or new), does not meet Sgarbossa criteria, no ST changes (personally reviewed).  2nd EKG: unchanged from prior -> NSR with frequent PVCs HR 81 bpm,  ms, does not meet Sgarbossa criteria, LBBB, trigeminy (personally reviewed).     Imaging:   - CXR: increased vascular congestion bilaterally, no consolidation, no pneumothorax, no effusion (personally reviewed).   - CTA: No pulmonary embolus. Findings of pulmonary edema including interlobular septal thickening, groundglass, and trace pleural effusions. Follow to resolution with repeat chest CT in one month, or sooner as clinically warranted. Additional findings suggestive of cardiac dysfunction. Recommend echocardiogram. Coronary artery calcifications. Mild emphysematous changes. Scattered sub-4 mm pulmonary nodules and numerous nonspecific small mediastinal nodes up to 9 mm size can be reevaluated on subsequent chest CT.    Pt was given 20 mg IVP lasix. She is being admitted to telemetry for further management.    (13 Jul 2023 01:02)        Patient seen and examined. No c/o symptoms other than identified in ROS    for [facility] today       MEDICATIONS  (STANDING):  aspirin  chewable 81 milliGRAM(s) Oral daily  atorvastatin 40 milliGRAM(s) Oral at bedtime  carvedilol 12.5 milliGRAM(s) Oral every 12 hours  cholecalciferol 1000 Unit(s) Oral daily  clopidogrel Tablet 75 milliGRAM(s) Oral daily  cyanocobalamin 1000 MICROGram(s) Oral daily  enoxaparin Injectable 40 milliGRAM(s) SubCutaneous every 24 hours  lactobacillus acidophilus 1 Tablet(s) Oral two times a day with meals  multivitamin 1 Tablet(s) Oral daily  omega-3-Acid Ethyl Esters 2 Gram(s) Oral two times a day  vitamin E 400 International Unit(s) Oral daily  zinc sulfate 220 milliGRAM(s) Oral daily      Vital Signs Last 24 Hrs  T(C): 36.2 (14 Jul 2023 08:27), Max: 36.8 (13 Jul 2023 21:01)  T(F): 97.1 (14 Jul 2023 08:27), Max: 98.3 (13 Jul 2023 21:01)  HR: 81 (14 Jul 2023 08:27) (74 - 83)  BP: 110/84 (14 Jul 2023 08:27) (110/84 - 137/116)  BP(mean): --  RR: 16 (14 Jul 2023 08:27) (16 - 18)  SpO2: 99% (14 Jul 2023 08:27) (97% - 99%)    Parameters below as of 14 Jul 2023 07:10  Patient On (Oxygen Delivery Method): room air        LABS:                        12.6   7.63  )-----------( 137      ( 14 Jul 2023 07:24 )             37.9     07-14    144  |  110<H>  |  25<H>  ----------------------------<  100<H>  3.4<L>   |  29  |  0.65    Ca    9.1      14 Jul 2023 07:24  Phos  3.6     07-14  Mg     2.1     07-14    TPro  6.1  /  Alb  3.0<L>  /  TBili  0.5  /  DBili  x   /  AST  8<L>  /  ALT  12  /  AlkPhos  102  07-12        PT/INR - ( 13 Jul 2023 07:02 )   PT: 12.8 sec;   INR: 1.10 ratio         PTT - ( 12 Jul 2023 16:39 )  PTT:28.2 sec    PHYSICAL EXAM  GENERAL: NAD, AAOx3  CHEST/LUNG: Clear to auscultation bilaterally; No wheeze  HEART: s1 s2 Regular rate and rhythm; No murmurs, rubs, or gallops  ABDOMEN: Soft, Nontender, Nondistended; Bowel sounds present X 4 quadrants   EXTREMITIES:  2+ Peripheral Pulses, No clubbing, cyanosis, or edema  PYSCH: normal affect and behavior, calm and cooperative     PLAN:  -plan for cardiac cath for ischemic work up  -Consent obtained for cardiac catheterization w/ coronary angiogram and possible stent placement, with possible sedation and analgia. Pt is competent, has capacity, and understands risks and benefits of procedure. Risks and benefits discussed. Risk discussed included, but not limited to MI, stroke, mortality, major bleeding, arrythmia, or infection. All questions answered   -EDVIN bolus excluded 2/2 CHR    ASA class: II  Creatinine: 0.65  GFR: 87  Bleeding  Risk score: 3  Carrillo Score: 10

## 2023-07-14 NOTE — PROGRESS NOTE ADULT - ASSESSMENT
84 y/o F presented with shortness of breath     Acute CHF exacerbation   Elevated troponin likely demand ischemia r/o NSTEMI   New LBBB   - Admit to telemetry   - STAT dose of 325 mg of aspirin, c/w 81 mg of aspirin daily    - c/w home medications: statin, beta blocker, ACE inhibitor   - addn of plavix   - NPO FOR CARDIAC CATH 7/14/23 - non obstructive CAD  - ECHO is pending   - CTA NEG FOR PE  - monitor Creatinine closely - pt got contrast for PE study, lasix IV 60 yesterday and cath today   - watch Cr off IVFs, follow-up ECHO, determine meds for discharge    - Strict I+Os, daily weights ; 2L H20 restriction, 2g sodium restriction        Dyselectrolytemia  - Keep K4, Mg2, replete prn     Scattered sub-4 mm pulmonary nodules and numerous nonspecific small mediastinal nodes up to 9 mm size  - f/u CT chest in 4-6 weeks     History of COVID, IBS, Cardiomyopathy, "leaky heart valves," HTN, HLD, [place], s/p cholecystectomy, s/p cataract surgery, s/p meniscus surgery, carotid artery stenosis (50% on left per pt)   - c/w home medications; verified with pt at the bedside   - , Na 146, monitor   - Ordered US carotid arteries to assess stenosis - follow-up with Card as OP     Code status: Full code  Emergency contact: Amber Augustine (daughter) [phone]

## 2023-07-14 NOTE — PROGRESS NOTE ADULT - SUBJECTIVE AND OBJECTIVE BOX
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         Vital Signs  T(C): 36.2 (07-14-23 @ 08:27), Max: 36.8 (07-13-23 @ 21:01)  HR: 84 (07-14-23 @ 10:30) (74 - 84)  BP: 164/67 (07-14-23 @ 10:30) (110/84 - 164/67)  RR: 16 (07-14-23 @ 10:30) (16 - 18)  SpO2: 98% (07-14-23 @ 10:30) (95% - 99%)  Wt(kg): --        PHYSICAL EXAM:  NEURO: Non-focal, AxOx3.  No neuro deficits   CHEST/LUNG: Clear to auscultation bilaterally; No wheeze  HEART: s1 s2 Regular rate and rhythm; No murmurs, rubs, or gallops  ABDOMEN: Soft, Nontender, Nondistended; Bowel sounds present X 4 quadrants   EXTREMITIES:  2+ Peripheral Pulses, No clubbing, cyanosis, or edema   VASCULAR: Peripheral pulses palpable 2+ bilaterally  PROCEDURE SITE: -RFA accessed, sheath removed in RR by RN.  Site is without hematoma or bleeding. Sensation and MARGIE intact. Distal pulses palpable 2+, capillary refill < 2 seconds. Patient denies pain, numbness, tingling, CP or SOB. 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     s/p LHC revealing non obs disease   PLAN:  -VS, diet, activity as per post cath orders  - excluded from EDVIN 2/2 HF  -Encourage PO fluids  -Continue current medications  -Post cath instructions reviewed, patient verbalizes and understands instructions  -Discussed therapeutic lifestyle changes to reduce risk factors such as following a cardiac diet, weight loss, maintaining a healthy weight, exercise, smoking cessation, medication compliance, and regular follow-up  with PCP/Cardioloigst  -Plan of care discussed with patient, RN and Dr. Maria

## 2023-07-14 NOTE — PACU DISCHARGE NOTE - COMMENTS
Patient s/p LHC via Right Femoral artery. Dressing to right groin is clean, dry and intact. No s/s of bleeding or hematoma. RLE warm and mobile. VS stable. Patient denies pain. Report given to ELIZABETH Gruber on 3E and patient placed on remote telemetry monitor at this time. Jeanmarie, 3E Tele tech, confirms patient is being remotely monitored at this time. Patient pending transport to  at this time

## 2023-07-14 NOTE — PROGRESS NOTE ADULT - NUTRITIONAL ASSESSMENT
This patient has been assessed with a concern for Malnutrition and has been determined to have a diagnosis/diagnoses of Severe protein-calorie malnutrition.    This patient is being managed with:   Diet DASH/TLC-  Sodium & Cholesterol Restricted  Entered: Jul 13 2023  9:47AM    This patient has been assessed with a concern for Malnutrition and has been determined to have a diagnosis/diagnoses of Severe protein-calorie malnutrition.    This patient is being managed with:   Diet DASH/TLC-  Sodium & Cholesterol Restricted  Entered: Jul 13 2023  9:47AM  
This patient has been assessed with a concern for Malnutrition and has been determined to have a diagnosis/diagnoses of Severe protein-calorie malnutrition.    This patient is being managed with:   Diet DASH/TLC-  Sodium & Cholesterol Restricted  Entered: Jul 13 2023  9:47AM

## 2023-07-14 NOTE — PROGRESS NOTE ADULT - SUBJECTIVE AND OBJECTIVE BOX
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 Pt states that she got out of the pool to go to the bathroom. She felt a heaviness of her chest and was unable to catch her breath. The chest heaviness was located in the middle of her chest without radiation. She has been getting SOB on exertion recently mostly when she walks up 2 flights of stairs. Uses 1 pillow to sleep at night. Does not lay flat on her back. Reports having runny nose, cough, and swelling of her feet. Last ECHO was 1 year ago. She was following with her cardiologist for years but he no longer takes her insurance and she has not been for any recent testing. 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 Denies headaches, blurry vision, dizziness, fevers, chills, abdominal pain, N/V, diarrhea/constipation.      ER course: SpO2 93-97% on room air. Labs: , D-dimer 239, troponin 23.39 -> 83.83. Na 146, K+ 3.4, albumin 3.0, BNP 3224. 1st EKG: NSR with PVCs HR 85 bpm, normal intervals, , LBBB (pt is unsure if this is old or new), does not meet Sgarbossa criteria, no ST changes (personally reviewed).  2nd EKG: unchanged from prior -> NSR with frequent PVCs HR 81 bpm,  ms, does not meet Sgarbossa criteria, LBBB, trigeminy (personally reviewed).     Imaging:   - CXR: increased vascular congestion bilaterally, no consolidation, no pneumothorax, no effusion (personally reviewed).   - CTA: No pulmonary embolus. Findings of pulmonary edema including interlobular septal thickening, groundglass, and trace pleural effusions. Follow to resolution with repeat chest CT in one month, or sooner as clinically warranted. Additional findings suggestive of cardiac dysfunction. Recommend echocardiogram. Coronary artery calcifications. Mild emphysematous changes. Scattered sub-4 mm pulmonary nodules and numerous nonspecific small mediastinal nodes up to 9 mm size can be reevaluated on subsequent chest CT.    Pt was given 20 mg IVP lasix. She is being admitted to telemetry for further management.    (13 Jul 2023 01:02)    7/13. Cardiologist consulted for chest pain  Noted wit chest tightness, unable to catch her breath on exertion  Previously followed with Dr. Bhardwaj   Patient in 2015 ~ had cardiac cath revealing luminal  irregularities, normal coronaries  TTE on 2022 EF 50%, mild MR    7/14. Anxious for cath, no other complaints. no overnight events.  Plan for cath today    MEDICATIONS:  atorvastatin 40 milliGRAM(s) Oral at bedtime  carvedilol 12.5 milliGRAM(s) Oral every 12 hours  cholecalciferol 1000 Unit(s) Oral daily  cyanocobalamin 1000 MICROGram(s) Oral daily  enoxaparin Injectable 40 milliGRAM(s) SubCutaneous every 24 hours  furosemide   Injectable 40 milliGRAM(s) IV Push daily  lactobacillus acidophilus 1 Tablet(s) Oral two times a day with meals  lisinopril 40 milliGRAM(s) Oral daily  multivitamin 1 Tablet(s) Oral daily  omega-3-Acid Ethyl Esters 2 Gram(s) Oral two times a day  potassium chloride    Tablet ER 20 milliEquivalent(s) Oral once  potassium chloride   Powder 40 milliEquivalent(s) Oral once  vitamin E 400 International Unit(s) Oral daily  zinc sulfate 220 milliGRAM(s) Oral daily    MEDICATIONS  (PRN):  acetaminophen     Tablet .. 650 milliGRAM(s) Oral every 6 hours PRN Temp greater or equal to 38C (100.4F), Mild Pain (1 - 3)  melatonin 3 milliGRAM(s) Oral at bedtime PRN Insomnia  nitroglycerin     SubLingual 0.4 milliGRAM(s) SubLingual every 5 minutes PRN Chest Pain    HOME MEDICATIONS:  atorvastatin 20 mg oral tablet: 1 tab(s) orally once a day (at bedtime) (12 Jul 2023 21:14)  carvedilol 12.5 mg oral tablet: 1 tab(s) orally 2 times a day (12 Jul 2023 21:13)  Co-Q10 100 mg oral capsule: 1 cap(s) orally once a day (12 Jul 2023 21:18)  Fish Oil 3-6-9 OTC supplement:  (12 Jul 2023 21:18)  Garlic oral capsule: 1 cap(s) orally once a day (12 Jul 2023 21:18)  hydroCHLOROthiazide 12.5 mg oral tablet: 1 tab(s) orally once a day (12 Jul 2023 21:14)  isosorbide mononitrate 30 mg oral tablet, extended release: 0.5 tab(s) orally once a day (in the afternoon) (12 Jul 2023 21:13)  lisinopril 40 mg oral tablet: 1 tab(s) orally once a day (12 Jul 2023 21:14)  Multiple Vitamins oral tablet: 1 tab(s) orally once a day (12 Jul 2023 21:14)  Optiflex-G 750 mg oral tablet: 1 tab(s) orally once a day (12 Jul 2023 21:18)  Probiotic Formula oral capsule: 1 cap(s) orally once a day (12 Jul 2023 21:18)  resveratrol OTC anti-oxidant supplement:  (12 Jul 2023 21:21)  Vitamin B-12 1000 mcg oral tablet: 1 tab(s) orally once a day (12 Jul 2023 21:14)  Vitamin D3 25 mcg (1000 intl units) oral capsule: 1 cap(s) orally once a day (12 Jul 2023 21:14)  vitamin E 200 intl units oral capsule: 1 cap(s) orally once a day (12 Jul 2023 21:14)  zinc (as gluconate) 50 mg oral tablet: 1 tab(s) orally once a day (12 Jul 2023 21:18)    PHYSICAL EXAM:  T(C): 36.4 (13 Jul 2023 08:33), Max: 37 (12 Jul 2023 21:25)  T(F): 97.6 (13 Jul 2023 08:33), Max: 98.6 (12 Jul 2023 21:25)  HR: 77 (13 Jul 2023 08:33) (77 - 88)  BP: 145/79 (13 Jul 2023 08:33) (115/57 - 145/79)  RR: 18 (13 Jul 2023 08:33) (18 - 18)  SpO2: 97% (13 Jul 2023 08:33) (93% - 99%)    Parameters below as of 13 Jul 2023 08:33  Patient On (Oxygen Delivery Method): room air    Constitutional: NAD, awake and alert  HEENT: PERR, EOMI, Normal Hearing, MMM  Neck: Soft and supple, No LAD, No JVD  Respiratory: Breath sounds are clear bilaterally, No wheezing, rales or rhonchi  Cardiovascular: S1 and S2, regular rate and rhythm, no Murmurs, gallops or rubs  Gastrointestinal: Bowel Sounds present, soft, nontender, nondistended, no guarding, no rebound  Extremities: No peripheral edema  Vascular: 2+ peripheral pulses  Neurological: A/O x 3, no focal deficits  Musculoskeletal: 5/5 strength b/l upper and lower extremities  Skin: No rashes    =======================================    INTERPRETATION OF TELEMETRY: SR, bigem    ECG: LBBB, SR PVC    ========================================    LABS:                        12.1   6.75  )-----------( 129      ( 13 Jul 2023 07:02 )             36.8     07-13    147<H>  |  110<H>  |  14  ----------------------------<  96  3.4<L>   |  33<H>  |  0.61    Ca    9.1      13 Jul 2023 07:02  Phos  3.3     07-13  Mg     2.0     07-13    TPro  6.1  /  Alb  3.0<L>  /  TBili  0.5  /  DBili  x   /  AST  8<L>  /  ALT  12  /  AlkPhos  102  07-12    PT/INR - ( 13 Jul 2023 07:02 )   PT: 12.8 sec;   INR: 1.10 ratio       PTT - ( 12 Jul 2023 16:39 )  PTT:28.2 sec    TroponinI hsT: <-61.36, <-83.83, <-23.39    BNP 3224        RADIOLOGY & ADDITIONAL STUDIES:    7/13/23: US Duplex Carotid Arteries Complete, Bilateral: No significant hemodynamic stenosis of the left internal carotid artery. Elevated peak systolic velocity at the right distal ICA, suggestive of a moderate (50-69%) stenosis. Irregular arterial pulses are noted.    7/12/23: CT Angio Chest PE Protocol w/ IV Cont: No pulmonary embolus. Findings of pulmonary edema including interlobular septal thickening, groundglass, and trace pleural effusions. Follow to resolution with repeat chest CT in one month, or sooner as clinically warranted. Additional findings suggestive of cardiac dysfunction. Recommend echocardiogram. Coronary artery calcifications.    7/12/23: Xray Chest 1 View: 1. Prominent cardiac silhouette with central pulmonary venous engorgement and questionable early interstitial pulmonary edema.  2. No evidence of pneumonia or pleural effusion. 3. Degenerative changes of the spine and shoulders.

## 2023-07-14 NOTE — PROGRESS NOTE ADULT - ASSESSMENT
Patient admitted for pulmonary edema 2/2 ADHFrEF, EP consulted for possible BiV PPM + ICD. Patient responding well to diuretic therapy with document improvement of symptoms NYHA IV -> II and documented weight loss of 2 lbs. Of note patient has history of NICM in January of 2015 with documented EF: 30%, Cath performed on 03/2015 with luminal irregularities of coronaries no hemodynamic significant lesions. Patient was placed on GDMT and LIfevest until June 2015 when repeat ECHO documented EF: 40%. She had been following with her private cardiologist Dr. Bhardwaj until April 2022, ECHO performed at that visit showed LVEF: 50% and mild mitral regurgitation.    Prior to this admission patient denies sick contacts, cough, fever, malaise or URI symptoms. No history of SCD or syncope      Plan  At the time of evaluation no official echo read is available, patient is scheduled for Angiography later today. Currently patient is not a candidate for BiV PPM + ICD.   - Patient is not a candidate for CRT as QRS < 130 ms and not on 3 months of GDMT.  - In the stetting of IHD patient would need to be reevaluated 40 days after PCI, to assess EF and NYHA class and determine eligibility for ICD. EP will follow in house for consideration of WCD at discharge, at this time unlikely to benefit given no clinical evidence of ventricular arrhythmias.  - In the setting of NICM patient will need to be started on GDMT. Patient should follow with EP clinic at 90 days after starting GDMT to reassess EF and NYHA class and determine ICD eligibility.   - EP will follow for Angiography and ECHO results  - Agree with holding ACEi/ARB in light of possible initiation of ARNi   - Continue Aspirin, Plavix, Coreg, Statin, Lasix  - Replete K to goal of > 4 Patient admitted for pulmonary edema 2/2 ADHFrEF, EP consulted for possible BiV PPM + ICD. Patient responding well to diuretic therapy with document improvement of symptoms NYHA IV -> II and documented weight loss of 2 lbs. Of note patient has history of NICM in January of 2015 with documented EF: 30%, Cath performed on 03/2015 with luminal irregularities of coronaries no hemodynamic significant lesions. Patient was placed on GDMT and LIfevest until June 2015 when repeat ECHO documented EF: 40%. She had been following with her private cardiologist Dr. Bhardwaj until April 2022, ECHO performed at that visit showed LVEF: 50% and mild mitral regurgitation.    Prior to this admission patient denies sick contacts, cough, fever, malaise or URI symptoms. No history of SCD or syncope      Plan  At the time of evaluation no official echo read is available, patient is scheduled for Angiography later today. Currently patient is not a candidate for BiV PPM + ICD.   - Patient is not a candidate for CRT as QRS < 130 ms and not on 3 months of GDMT.  - Pending CATH and ECHO results    PENDING DISCUSSION WITH ATTENDING Patient admitted for pulmonary edema 2/2 ADHFrEF 2/2 NICM, EP consulted for possible BiV PPM + ICD. Patient responding well to diuretic therapy with document improvement of symptoms NYHA IV -> II and documented weight loss of 2 lbs. Of note patient has history of NICM in January of 2015 with documented EF: 30%, Cath performed on 03/2015 with luminal irregularities of coronaries no hemodynamic significant lesions. Patient was placed on GDMT and LIfevest until June 2015 when repeat ECHO documented EF: 40%. She had been following with her private cardiologist Dr. Bhardwaj until April 2022, ECHO performed at that visit showed LVEF: 50% and mild mitral regurgitation.    Prior to this admission patient denies sick contacts, cough, fever, malaise or URI symptoms. No history of SCD or syncope. Currently patient denies chest pain SOB, syncope or weakness      Plan  Patient with NICMrEF currently not a candidate for BiV PPM + ICD.   - Patient is not a candidate for CRT as QRS < 130 ms and not on 3 months of GDMT, will not benefit from WCD in the interim.  - Patient should follow with EP clinic at 90 days after starting GDMT to reassess EF and NYHA class and determine ICD eligibility. Appointment set up for   - Please ensure K > 4 Patient admitted for pulmonary edema 2/2 ADHFrEF 2/2 NICM, EP consulted for possible BiV PPM + ICD. Patient responding well to diuretic therapy with document improvement of symptoms NYHA IV -> II and documented weight loss of 2 lbs. Of note patient has history of NICM in January of 2015 with documented EF: 30%, Cath performed on 03/2015 with luminal irregularities of coronaries no hemodynamic significant lesions. Patient was placed on GDMT and LIfevest until June 2015 when repeat ECHO documented EF: 40%. She had been following with her private cardiologist Dr. Bhardwaj until April 2022, ECHO performed at that visit showed LVEF: 50% and mild mitral regurgitation.    Prior to this admission patient denies sick contacts, cough, fever, malaise or URI symptoms. No history of SCD or syncope. Currently patient denies chest pain SOB, syncope or weakness      Plan  Patient with NICMrEF currently not a candidate for BiV PPM + ICD.   - Patient is not a candidate for CRT as QRS < 130 ms and not on 3 months of GDMT, will not benefit from WCD in the interim.  - Patient should follow with EP clinic at 90 days after starting GDMT to reassess EF and NYHA class and determine ICD eligibility. Appointment set up for 10/12/23 at 13:00  - Please ensure K > 4

## 2023-07-14 NOTE — PROGRESS NOTE ADULT - SUBJECTIVE AND OBJECTIVE BOX
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 Pt states that she got out of the pool to go to the bathroom. She felt a heaviness of her chest and was unable to catch her breath. The chest heaviness was located in the middle of her chest without radiation. She has been getting SOB on exertion recently mostly when she walks up 2 flights of stairs. Uses 1 pillow to sleep at night. Does not lay flat on her back. Reports having runny nose, cough, and swelling of her feet. Last ECHO was 1 year ago. She was following with her cardiologist for years but he no longer takes her insurance and she has not been for any recent testing. 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 Denies headaches, blurry vision, dizziness, fevers, chills, abdominal pain, N/V, diarrhea/constipation.      7/13 - no cp palps; breathing much improved after lasix - urinating a lot  7/14 - pt seen in cath lab, s/p cath, no cp palps sob, got lasix 60 IV yesterday     Vital Signs Last 24 Hrs  T(F): 97.6 (14 Jul 2023 17:04), Max: 98.3 (13 Jul 2023 21:01)  HR: 83 (14 Jul 2023 17:04) (71 - 84)  BP: 114/73 (14 Jul 2023 17:04) (110/84 - 164/67)  RR: 18 (14 Jul 2023 17:04) (16 - 18)  SpO2: 97% (14 Jul 2023 17:04) (95% - 99%)/RA   Constitutional: NAD, awake and alert, sitting up in bed, no distress at this time   HEENT: PERR, EOMI  Neck: Soft and supple,  No JVD  Respiratory: Breath sounds are clear bilaterally, No wheezing, rales or rhonchi  Cardiovascular: S1 and S2, regular rate and rhythm, no Murmurs  Gastrointestinal: Bowel Sounds present, soft, nontender, nondistended  Extremities: No peripheral edema  Vascular: 2+ peripheral pulses  Neurological: A/O x 3, no focal deficits  Musculoskeletal: 5/5 strength b/l upper and lower extremities  Skin: No rashes    RADIOLOGY/EKG:  < from: CT Angio Chest PE Protocol w/ IV Cont (07.12.23 @ 18:27) >  No pulmonary embolus.  Findings of pulmonary edema including interlobular septal thickening,   groundglass, and trace pleural effusions. Follow to resolution with   repeat chest CT in one month, or sooner as clinically warranted.  Additional findings suggestive of cardiac dysfunction. Recommend   echocardiogram. Coronary artery calcifications.  Mild emphysematous changes. Scattered sub-4 mm pulmonary nodules and   numerous nonspecific small mediastinal nodes up to 9 mm size can be   reevaluated on subsequent chest CT.    < from: US Duplex Carotid Arteries Complete, Bilateral (07.13.23 @ 08:36) >  IMPRESSION: No significant hemodynamic stenosis of the left internal   carotid artery.  Elevated peak systolic velocity at the right distal ICA, suggestive of a   moderate (50-69%) stenosis.  Irregular arterial pulses are noted.  Measurement of carotid stenosis is based on velocity parameters that   correlate the residual internal carotid diameter with that of the more   distal vessel in accordance with a method such as the [place] American   Symptomatic Carotid Endarterectomy Trial (NASCET).        LABS: All Labs Reviewed:                      12.6   7.63  )-----------( 137      ( 14 Jul 2023 07:24 )             37.9     144  |  110<H>  |  25<H>  ----------------------------<  100<H>  3.4<L>   |  29  |  0.65      MEDS:   acetaminophen     Tablet .. 650 milliGRAM(s) Oral every 6 hours PRN  aspirin  chewable 81 milliGRAM(s) Oral daily  atorvastatin 40 milliGRAM(s) Oral at bedtime  cholecalciferol 1000 Unit(s) Oral daily  clopidogrel Tablet 75 milliGRAM(s) Oral daily  cyanocobalamin 1000 MICROGram(s) Oral daily  enoxaparin Injectable 40 milliGRAM(s) SubCutaneous every 24 hours  lactobacillus acidophilus 1 Tablet(s) Oral two times a day with meals  melatonin 3 milliGRAM(s) Oral at bedtime PRN  metoprolol succinate ER 50 milliGRAM(s) Oral two times a day  multivitamin 1 Tablet(s) Oral daily  nitroglycerin     SubLingual 0.4 milliGRAM(s) SubLingual every 5 minutes PRN  omega-3-Acid Ethyl Esters 2 Gram(s) Oral two times a day  vitamin E 400 International Unit(s) Oral daily  zinc sulfate 220 milliGRAM(s) Oral daily

## 2023-07-14 NOTE — ED PROVIDER NOTE - NS ED MD DISPO ISOLATION TYPES
Azelaic Acid Pregnancy And Lactation Text: This medication is considered safe during pregnancy and breast feeding. Airborne/Contact

## 2023-07-15 ENCOUNTER — TRANSCRIPTION ENCOUNTER (OUTPATIENT)
Age: 83
End: 2023-07-15

## 2023-07-15 VITALS
SYSTOLIC BLOOD PRESSURE: 125 MMHG | TEMPERATURE: 98 F | HEART RATE: 78 BPM | RESPIRATION RATE: 17 BRPM | OXYGEN SATURATION: 97 % | DIASTOLIC BLOOD PRESSURE: 97 MMHG

## 2023-07-15 LAB
ANION GAP SERPL CALC-SCNC: 3 MMOL/L — LOW (ref 5–17)
BUN SERPL-MCNC: 22 MG/DL — SIGNIFICANT CHANGE UP (ref 7–23)
CALCIUM SERPL-MCNC: 9.2 MG/DL — SIGNIFICANT CHANGE UP (ref 8.5–10.1)
CHLORIDE SERPL-SCNC: 114 MMOL/L — HIGH (ref 96–108)
CO2 SERPL-SCNC: 27 MMOL/L — SIGNIFICANT CHANGE UP (ref 22–31)
CREAT SERPL-MCNC: 0.51 MG/DL — SIGNIFICANT CHANGE UP (ref 0.5–1.3)
EGFR: 92 ML/MIN/1.73M2 — SIGNIFICANT CHANGE UP
GLUCOSE SERPL-MCNC: 102 MG/DL — HIGH (ref 70–99)
MAGNESIUM SERPL-MCNC: 2.2 MG/DL — SIGNIFICANT CHANGE UP (ref 1.6–2.6)
POTASSIUM SERPL-MCNC: 4.2 MMOL/L — SIGNIFICANT CHANGE UP (ref 3.5–5.3)
POTASSIUM SERPL-SCNC: 4.2 MMOL/L — SIGNIFICANT CHANGE UP (ref 3.5–5.3)
SODIUM SERPL-SCNC: 144 MMOL/L — SIGNIFICANT CHANGE UP (ref 135–145)

## 2023-07-15 PROCEDURE — 99239 HOSP IP/OBS DSCHRG MGMT >30: CPT

## 2023-07-15 RX ORDER — ATORVASTATIN CALCIUM 80 MG/1
1 TABLET, FILM COATED ORAL
Refills: 0 | DISCHARGE

## 2023-07-15 RX ORDER — FUROSEMIDE 40 MG
1 TABLET ORAL
Qty: 15 | Refills: 0
Start: 2023-07-15 | End: 2023-07-29

## 2023-07-15 RX ORDER — UBIDECARENONE 100 MG
1 CAPSULE ORAL
Refills: 0 | DISCHARGE

## 2023-07-15 RX ORDER — LISINOPRIL 2.5 MG/1
1 TABLET ORAL
Refills: 0 | DISCHARGE

## 2023-07-15 RX ORDER — ZINC GLUCONATE 30 MG
1 TABLET ORAL
Refills: 0 | DISCHARGE

## 2023-07-15 RX ORDER — HYDROCHLOROTHIAZIDE 25 MG
1 TABLET ORAL
Refills: 0 | DISCHARGE

## 2023-07-15 RX ORDER — SACUBITRIL AND VALSARTAN 24; 26 MG/1; MG/1
1 TABLET, FILM COATED ORAL
Qty: 60 | Refills: 0
Start: 2023-07-15 | End: 2023-08-13

## 2023-07-15 RX ORDER — GARLIC 1000 MG
1 CAPSULE ORAL
Refills: 0 | DISCHARGE

## 2023-07-15 RX ORDER — ATORVASTATIN CALCIUM 80 MG/1
1 TABLET, FILM COATED ORAL
Qty: 30 | Refills: 0
Start: 2023-07-15 | End: 2023-08-13

## 2023-07-15 RX ORDER — ASPIRIN/CALCIUM CARB/MAGNESIUM 324 MG
1 TABLET ORAL
Qty: 30 | Refills: 0
Start: 2023-07-15 | End: 2023-08-13

## 2023-07-15 RX ORDER — ACETAMINOPHEN 500 MG
2 TABLET ORAL
Qty: 0 | Refills: 0 | DISCHARGE
Start: 2023-07-15

## 2023-07-15 RX ORDER — CARVEDILOL PHOSPHATE 80 MG/1
1 CAPSULE, EXTENDED RELEASE ORAL
Refills: 0 | DISCHARGE

## 2023-07-15 RX ORDER — ISOSORBIDE MONONITRATE 60 MG/1
0.5 TABLET, EXTENDED RELEASE ORAL
Refills: 0 | DISCHARGE

## 2023-07-15 RX ORDER — L.ACIDOPH/B.ANIMALIS/B.LONGUM 15B CELL
1 CAPSULE ORAL
Refills: 0 | DISCHARGE

## 2023-07-15 RX ORDER — METOPROLOL TARTRATE 50 MG
1 TABLET ORAL
Qty: 60 | Refills: 0
Start: 2023-07-15 | End: 2023-08-13

## 2023-07-15 RX ADMIN — Medication 81 MILLIGRAM(S): at 08:42

## 2023-07-15 RX ADMIN — SACUBITRIL AND VALSARTAN 1 TABLET(S): 24; 26 TABLET, FILM COATED ORAL at 08:42

## 2023-07-15 RX ADMIN — Medication 2 GRAM(S): at 08:41

## 2023-07-15 RX ADMIN — ZINC SULFATE TAB 220 MG (50 MG ZINC EQUIVALENT) 220 MILLIGRAM(S): 220 (50 ZN) TAB at 08:42

## 2023-07-15 RX ADMIN — Medication 1 TABLET(S): at 08:41

## 2023-07-15 RX ADMIN — Medication 1 TABLET(S): at 08:42

## 2023-07-15 RX ADMIN — Medication 50 MILLIGRAM(S): at 08:43

## 2023-07-15 RX ADMIN — Medication 400 INTERNATIONAL UNIT(S): at 08:42

## 2023-07-15 RX ADMIN — Medication 1000 UNIT(S): at 08:42

## 2023-07-15 RX ADMIN — CLOPIDOGREL BISULFATE 75 MILLIGRAM(S): 75 TABLET, FILM COATED ORAL at 08:43

## 2023-07-15 RX ADMIN — PREGABALIN 1000 MICROGRAM(S): 225 CAPSULE ORAL at 08:42

## 2023-07-15 NOTE — DISCHARGE NOTE PROVIDER - NSDCMRMEDTOKEN_GEN_ALL_CORE_FT
acetaminophen 325 mg oral tablet: 2 tab(s) orally every 6 hours As needed Temp greater or equal to 38C (100.4F), Mild Pain (1 - 3)  atorvastatin 20 mg oral tablet: 1 tab(s) orally once a day (at bedtime)  carvedilol 12.5 mg oral tablet: 1 tab(s) orally 2 times a day  Co-Q10 100 mg oral capsule: 1 cap(s) orally once a day  Fish Oil 3-6-9 OTC supplement:   Garlic oral capsule: 1 cap(s) orally once a day  hydroCHLOROthiazide 12.5 mg oral tablet: 1 tab(s) orally once a day  isosorbide mononitrate 30 mg oral tablet, extended release: 0.5 tab(s) orally once a day (in the afternoon)  Multiple Vitamins oral tablet: 1 tab(s) orally once a day  Optiflex-G 750 mg oral tablet: 1 tab(s) orally once a day  Probiotic Formula oral capsule: 1 cap(s) orally once a day  resveratrol OTC anti-oxidant supplement:   Vitamin B-12 1000 mcg oral tablet: 1 tab(s) orally once a day  Vitamin D3 25 mcg (1000 intl units) oral capsule: 1 cap(s) orally once a day  vitamin E 200 intl units oral capsule: 1 cap(s) orally once a day  zinc (as gluconate) 50 mg oral tablet: 1 tab(s) orally once a day   acetaminophen 325 mg oral tablet: 2 tab(s) orally every 6 hours As needed Temp greater or equal to 38C (100.4F), Mild Pain (1 - 3)  Aspirin Enteric Coated 81 mg oral delayed release tablet: 1 tab(s) orally once a day  Fish Oil 3-6-9 OTC supplement:   Lasix 20 mg oral tablet: 1 tab(s) orally once a day as needed for leg edema, weight gain, shortness of breath,  Lipitor 40 mg oral tablet: 1 tab(s) orally once a day (at bedtime)  metoprolol tartrate 50 mg oral tablet: 1 tab(s) orally every 12 hours  Multiple Vitamins oral tablet: 1 tab(s) orally once a day  Optiflex-G 750 mg oral tablet: 1 tab(s) orally once a day  resveratrol OTC anti-oxidant supplement:   sacubitril-valsartan 24 mg-26 mg oral tablet: 1 tab(s) orally 2 times a day  Vitamin B-12 1000 mcg oral tablet: 1 tab(s) orally once a day  Vitamin D3 25 mcg (1000 intl units) oral capsule: 1 cap(s) orally once a day  vitamin E 200 intl units oral capsule: 1 cap(s) orally once a day

## 2023-07-15 NOTE — PROGRESS NOTE ADULT - ASSESSMENT
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     #Chest Tightness, NSTEMI. [facility] with nonobstructive CAD.  Troponin minimally elevated but downtrended  ECG - LBBB (appears new, records obtained from prior cardiology office)  Echo pending- severely reduced EF on prelim.  Continue Aspirin, Coreg, Statin. Can d/c plavix.     #hx Cardiomyopathy  #PVCs, high burden. Switch Coreg to Toprol 50mg BID.  Appears last Cardiac Cath in 2015, normal coronaries  Cont entresto, Bbl.   Pt to cont GDMT for 3m and then reassess LVEF prior to device placement.     #HTN. Continue current medications for now    #HLD. Continue statin. Check lipid panel.    #Carotid Stenosis. C/w medical management . On aspirin and statin.    #D/C planning today. Will f/u as outpt next week.

## 2023-07-15 NOTE — DISCHARGE NOTE NURSING/CASE MANAGEMENT/SOCIAL WORK - PATIENT PORTAL LINK FT
You can access the FollowMyHealth Patient Portal offered by [facility] by registering at the following website: [email or web address]. By joining [facility]’s FollowMyHealth portal, you will also be able to view your health information using other applications (apps) compatible with our system.

## 2023-07-15 NOTE — DISCHARGE NOTE PROVIDER - DETAILS OF MALNUTRITION DIAGNOSIS/DIAGNOSES
This patient has been assessed with a concern for Malnutrition and was treated during this hospitalization for the following Nutrition diagnosis/diagnoses:     -  07/13/2023: Severe protein-calorie malnutrition

## 2023-07-15 NOTE — DISCHARGE NOTE PROVIDER - HOSPITAL COURSE
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 Pt states that she got out of the pool to go to the bathroom. She felt a heaviness of her chest and was unable to catch her breath. The chest heaviness was located in the middle of her chest without radiation. She has been getting SOB on exertion recently mostly when she walks up 2 flights of stairs. Uses 1 pillow to sleep at night. Does not lay flat on her back. Reports having runny nose, cough, and swelling of her feet. Last ECHO was 1 year ago. She was following with her cardiologist for years but he no longer takes her insurance and she has not been for any recent testing. 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    HOSPITAL COURSE:   Acute CHF exacerbation, systolic   Elevated troponin likely demand ischemia r/o NSTEMI   New LBBB   - Admit to telemetry   - STAT dose of 325 mg of aspirin, c/w 81 mg of aspirin daily    - c/w home medications: statin, beta blocker, ACE inhibitor; addn of plavix   - ECHO is pending -  Estimated Ejection Fraction is 30-35 %.  - CTA NEG FOR PE  7/14 -  NPO FOR CARDIAC CATH 7/14/23 - non obstructive CAD  - monitor Creatinine closely - pt got contrast for PE study, lasix IV 60 yesterday and cath today   - watch Cr off IVFs, follow-up ECHO, determine meds for discharge    - Strict I+Os, daily weights ; 2L H20 restriction, 2g sodium restriction      7/15 - discussed with Card - Cr stable; euvolemic; dc home off lasix for now, cont to check weight daily, call cardiology office if weight gain of 2 pounds noted - will send home with lasix prn only  cont entresto BB statin asa; dc plavix   Pt to cont GDMT for 3mos and then reassess LVEF prior to device placement.     Dyselectrolytemia  - Keep K4, Mg2, replete prn     Scattered sub-4 mm pulmonary nodules and numerous nonspecific small mediastinal nodes up to 9 mm size  - f/u CT chest in 4-6 weeks     History of COVID, IBS, Cardiomyopathy, "leaky heart valves," HTN, HLD, [place], s/p cholecystectomy, s/p cataract surgery, s/p meniscus surgery, carotid artery stenosis (50% on left per pt)   - c/w home medications; verified with pt at the bedside   - , Na 146, monitor   - Ordered US carotid arteries to assess stenosis - follow-up with Card as OP     Code status: Full code  Emergency contact: Amber Augustine (daughter) [phone]     time spent on discharge - 50 mins        CC: 82 y/o F with PMH COVID, IBS, Cardiomyopathy, "leaky heart valves," HTN, HLD, [place], 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 Pt states that she got out of the pool to go to the bathroom. She felt a heaviness of her chest and was unable to catch her breath. The chest heaviness was located in the middle of her chest without radiation. She has been getting SOB on exertion recently mostly when she walks up 2 flights of stairs. Uses 1 pillow to sleep at night. Does not lay flat on her back. Reports having runny nose, cough, and swelling of her feet. Last ECHO was 1 year ago. She was following with her cardiologist for years but he no longer takes her insurance and she has not been for any recent testing. 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    HOSPITAL COURSE:   Acute CHF exacerbation, systolic   Elevated troponin likely demand ischemia r/o NSTEMI   New LBBB   - Admit to telemetry   - STAT dose of 325 mg of aspirin, c/w 81 mg of aspirin daily    - c/w home medications: statin, beta blocker, ACE inhibitor; addn of plavix   - ECHO is pending -  Estimated Ejection Fraction is 30-35 %.  - CTA NEG FOR PE  7/14 -  NPO FOR CARDIAC CATH 7/14/23 - non obstructive CAD  - monitor Creatinine closely - pt got contrast for PE study, lasix IV 60 yesterday and cath today   - watch Cr off IVFs, follow-up ECHO, determine meds for discharge    - Strict I+Os, daily weights ; 2L H20 restriction, 2g sodium restriction      7/15 - discussed with Card - Cr stable; euvolemic; dc home off lasix for now, cont to check weight daily, call cardiology office if weight gain of 2 pounds noted - will send home with lasix prn only  cont entresto BB statin asa; dc plavix   Pt to cont GDMT for 3mos and then reassess LVEF prior to device placement.     Dyselectrolytemia  - Keep K4, Mg2, replete prn     Scattered sub-4 mm pulmonary nodules and numerous nonspecific small mediastinal nodes up to 9 mm size  - f/u CT chest in 4-6 weeks     History of COVID, IBS, Cardiomyopathy, "leaky heart valves," HTN, HLD, [place], s/p cholecystectomy, s/p cataract surgery, s/p meniscus surgery, carotid artery stenosis (50% on left per pt)   - c/w home medications; verified with pt at the bedside   - , Na 146, monitor   - Ordered US carotid arteries to assess stenosis - follow-up with Card as OP     Code status: Full code  Emergency contact: Amber Augustine (daughter) [phone]     time spent on discharge - 50 mins

## 2023-07-15 NOTE — DISCHARGE NOTE PROVIDER - NSDCCPCAREPLAN_GEN_ALL_CORE_FT
PRINCIPAL DISCHARGE DIAGNOSIS  Diagnosis: Acute on chronic systolic congestive heart failure  Assessment and Plan of Treatment: cath does not show any obstructive disease  however you have heart failure with a low ejection fraction.  you are being sent home on a regimen of drugs to help improve your ejection fraction and make your heart stronger.   in addition   continue to check weight daily, call cardiology office if weight gain of 2 pounds noted - will send home with lasix as needed only, to use if you note any leg swelling in both legs or shortness of breath. call your cardiologist immediately - and they will determine further dosing   cont entresto metoprolol statin aspirin  2L fluid restriction, 2g sodium restriction      return to the ER if you have chest pain palpitations, shortness of breath or lightheadedness.  follow-up with cardiology

## 2023-07-15 NOTE — DISCHARGE NOTE PROVIDER - CARE PROVIDER_API CALL
Kodi Wei  Cardiovascular Disease  [address] [place] [address]  Phone: [phone]  Fax: [phone]  Follow Up Time: 1 week    Chuy Bradley  Cardiovascular Disease  [address] [place] [address]  Phone: [phone]  Fax: [phone]  Follow Up Time: Routine

## 2023-07-15 NOTE — DISCHARGE NOTE PROVIDER - NSDCFUSCHEDAPPT_GEN_ALL_CORE_FT
Chuy Bradley  [facility] Physician [address]  Scheduled Appointment: 10/12/2023

## 2023-07-15 NOTE — PROGRESS NOTE ADULT - REASON FOR ADMISSION
Shortness of breath and chest heaviness

## 2023-07-15 NOTE — PROGRESS NOTE ADULT - SUBJECTIVE AND OBJECTIVE BOX
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 Pt states that she got out of the pool to go to the bathroom. She felt a heaviness of her chest and was unable to catch her breath. The chest heaviness was located in the middle of her chest without radiation. She has been getting SOB on exertion recently mostly when she walks up 2 flights of stairs. Uses 1 pillow to sleep at night. Does not lay flat on her back. Reports having runny nose, cough, and swelling of her feet. Last ECHO was 1 year ago. She was following with her cardiologist for years but he no longer takes her insurance and she has not been for any recent testing. 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 Denies headaches, blurry vision, dizziness, fevers, chills, abdominal pain, N/V, diarrhea/constipation.      ER course: SpO2 93-97% on room air. Labs: , D-dimer 239, troponin 23.39 -> 83.83. Na 146, K+ 3.4, albumin 3.0, BNP 3224. 1st EKG: NSR with PVCs HR 85 bpm, normal intervals, , LBBB (pt is unsure if this is old or new), does not meet [place] criteria, no ST changes (personally reviewed).  2nd EKG: unchanged from prior -> NSR with frequent PVCs HR 81 bpm,  ms, does not meet [place] criteria, LBBB, trigeminy (personally reviewed).     Imaging:   - CXR: increased vascular congestion bilaterally, no consolidation, no pneumothorax, no effusion (personally reviewed).   - CTA: No pulmonary embolus. Findings of pulmonary edema including interlobular septal thickening, groundglass, and trace pleural effusions. Follow to resolution with repeat chest CT in one month, or sooner as clinically warranted. Additional findings suggestive of cardiac dysfunction. Recommend echocardiogram. Coronary artery calcifications. Mild emphysematous changes. Scattered sub-4 mm pulmonary nodules and numerous nonspecific small mediastinal nodes up to 9 mm size can be reevaluated on subsequent chest CT.    Pt was given 20 mg IVP lasix. She is being admitted to telemetry for further management.    (13 Jul 2023 01:02)    7/13. Cardiologist consulted for chest pain  Noted wit chest tightness, unable to catch her breath on exertion  Previously followed with Dr. Bhardwaj   Patient in 2015 ~ had cardiac cath revealing luminal  irregularities, normal coronaries  TTE on 2022 EF 50%, mild MR    7/14. Anxious for cath, no other complaints. no overnight events.  Plan for cath today    7/15. S/p LHC with nonobstructive disease. No CP/SOB this AM.  No events last pm. No new complaints. SR on tele.     MEDICATIONS:  atorvastatin 40 milliGRAM(s) Oral at bedtime  carvedilol 12.5 milliGRAM(s) Oral every 12 hours  cholecalciferol 1000 Unit(s) Oral daily  cyanocobalamin 1000 MICROGram(s) Oral daily  enoxaparin Injectable 40 milliGRAM(s) SubCutaneous every 24 hours  furosemide   Injectable 40 milliGRAM(s) IV Push daily  lactobacillus acidophilus 1 Tablet(s) Oral two times a day with meals  lisinopril 40 milliGRAM(s) Oral daily  multivitamin 1 Tablet(s) Oral daily  omega-3-Acid Ethyl Esters 2 Gram(s) Oral two times a day  potassium chloride    Tablet ER 20 milliEquivalent(s) Oral once  potassium chloride   Powder 40 milliEquivalent(s) Oral once  vitamin E 400 International Unit(s) Oral daily  zinc sulfate 220 milliGRAM(s) Oral daily    MEDICATIONS  (PRN):  acetaminophen     Tablet .. 650 milliGRAM(s) Oral every 6 hours PRN Temp greater or equal to 38C (100.4F), Mild Pain (1 - 3)  melatonin 3 milliGRAM(s) Oral at bedtime PRN Insomnia  nitroglycerin     SubLingual 0.4 milliGRAM(s) SubLingual every 5 minutes PRN Chest Pain    HOME MEDICATIONS:  atorvastatin 20 mg oral tablet: 1 tab(s) orally once a day (at bedtime) (12 Jul 2023 21:14)  carvedilol 12.5 mg oral tablet: 1 tab(s) orally 2 times a day (12 Jul 2023 21:13)  Co-Q10 100 mg oral capsule: 1 cap(s) orally once a day (12 Jul 2023 21:18)  Fish Oil 3-6-9 OTC supplement:  (12 Jul 2023 21:18)  Garlic oral capsule: 1 cap(s) orally once a day (12 Jul 2023 21:18)  hydroCHLOROthiazide 12.5 mg oral tablet: 1 tab(s) orally once a day (12 Jul 2023 21:14)  isosorbide mononitrate 30 mg oral tablet, extended release: 0.5 tab(s) orally once a day (in the afternoon) (12 Jul 2023 21:13)  lisinopril 40 mg oral tablet: 1 tab(s) orally once a day (12 Jul 2023 21:14)  Multiple Vitamins oral tablet: 1 tab(s) orally once a day (12 Jul 2023 21:14)  Optiflex-G 750 mg oral tablet: 1 tab(s) orally once a day (12 Jul 2023 21:18)  Probiotic Formula oral capsule: 1 cap(s) orally once a day (12 Jul 2023 21:18)  resveratrol OTC anti-oxidant supplement:  (12 Jul 2023 21:21)  Vitamin B-12 1000 mcg oral tablet: 1 tab(s) orally once a day (12 Jul 2023 21:14)  Vitamin D3 25 mcg (1000 intl units) oral capsule: 1 cap(s) orally once a day (12 Jul 2023 21:14)  vitamin E 200 intl units oral capsule: 1 cap(s) orally once a day (12 Jul 2023 21:14)  zinc (as gluconate) 50 mg oral tablet: 1 tab(s) orally once a day (12 Jul 2023 21:18)    PHYSICAL EXAM:  T(C): 36.4 (13 Jul 2023 08:33), Max: 37 (12 Jul 2023 21:25)  T(F): 97.6 (13 Jul 2023 08:33), Max: 98.6 (12 Jul 2023 21:25)  HR: 77 (13 Jul 2023 08:33) (77 - 88)  BP: 145/79 (13 Jul 2023 08:33) (115/57 - 145/79)  RR: 18 (13 Jul 2023 08:33) (18 - 18)  SpO2: 97% (13 Jul 2023 08:33) (93% - 99%)    Parameters below as of 13 Jul 2023 08:33  Patient On (Oxygen Delivery Method): room air    Constitutional: NAD, awake and alert  HEENT: PERR, EOMI, Normal Hearing, MMM  Neck: Soft and supple, No LAD, No JVD  Respiratory: Breath sounds are clear bilaterally, No wheezing, rales or rhonchi  Cardiovascular: S1 and S2, regular rate and rhythm, no Murmurs, gallops or rubs  Gastrointestinal: Bowel Sounds present, soft, nontender, nondistended, no guarding, no rebound  Extremities: No peripheral edema  Vascular: 2+ peripheral pulses  Neurological: A/O x 3, no focal deficits  Musculoskeletal: 5/5 strength b/l upper and lower extremities  Skin: No rashes    =======================================    INTERPRETATION OF TELEMETRY: SR, bigem    ECG: LBBB, SR PVC    ========================================    LABS:                        12.1   6.75  )-----------( 129      ( 13 Jul 2023 07:02 )             36.8     07-13    147<H>  |  110<H>  |  14  ----------------------------<  96  3.4<L>   |  33<H>  |  0.61    Ca    9.1      13 Jul 2023 07:02  Phos  3.3     07-13  Mg     2.0     07-13    TPro  6.1  /  Alb  3.0<L>  /  TBili  0.5  /  DBili  x   /  AST  8<L>  /  ALT  12  /  AlkPhos  102  07-12    PT/INR - ( 13 Jul 2023 07:02 )   PT: 12.8 sec;   INR: 1.10 ratio       PTT - ( 12 Jul 2023 16:39 )  PTT:28.2 sec    TroponinI hsT: <-61.36, <-83.83, <-23.39    BNP 3224        RADIOLOGY & ADDITIONAL STUDIES:    7/13/23: US Duplex Carotid Arteries Complete, Bilateral: No significant hemodynamic stenosis of the left internal carotid artery. Elevated peak systolic velocity at the right distal ICA, suggestive of a moderate (50-69%) stenosis. Irregular arterial pulses are noted.    7/12/23: CT Angio Chest PE Protocol w/ IV Cont: No pulmonary embolus. Findings of pulmonary edema including interlobular septal thickening, groundglass, and trace pleural effusions. Follow to resolution with repeat chest CT in one month, or sooner as clinically warranted. Additional findings suggestive of cardiac dysfunction. Recommend echocardiogram. Coronary artery calcifications.    7/12/23: Xray Chest 1 View: 1. Prominent cardiac silhouette with central pulmonary venous engorgement and questionable early interstitial pulmonary edema.  2. No evidence of pneumonia or pleural effusion. 3. Degenerative changes of the spine and shoulders.

## 2023-08-25 PROBLEM — Z00.00 ENCOUNTER FOR PREVENTIVE HEALTH EXAMINATION: Status: ACTIVE | Noted: 2023-08-25

## 2023-09-14 ENCOUNTER — NON-APPOINTMENT (OUTPATIENT)
Age: 83
End: 2023-09-14

## 2023-09-14 ENCOUNTER — APPOINTMENT (OUTPATIENT)
Dept: ELECTROPHYSIOLOGY | Facility: CLINIC | Age: 83
End: 2023-09-14
Payer: MEDICARE

## 2023-09-14 VITALS
HEART RATE: 66 BPM | SYSTOLIC BLOOD PRESSURE: 134 MMHG | RESPIRATION RATE: 16 BRPM | WEIGHT: 120 LBS | OXYGEN SATURATION: 100 % | HEIGHT: 62 IN | DIASTOLIC BLOOD PRESSURE: 76 MMHG | BODY MASS INDEX: 22.08 KG/M2

## 2023-09-14 DIAGNOSIS — I50.20 UNSPECIFIED SYSTOLIC (CONGESTIVE) HEART FAILURE: ICD-10-CM

## 2023-09-14 DIAGNOSIS — Z78.9 OTHER SPECIFIED HEALTH STATUS: ICD-10-CM

## 2023-09-14 DIAGNOSIS — Z87.19 PERSONAL HISTORY OF OTHER DISEASES OF THE DIGESTIVE SYSTEM: ICD-10-CM

## 2023-09-14 DIAGNOSIS — I50.22 CHRONIC SYSTOLIC (CONGESTIVE) HEART FAILURE: ICD-10-CM

## 2023-09-14 DIAGNOSIS — I42.8 OTHER CARDIOMYOPATHIES: ICD-10-CM

## 2023-09-14 DIAGNOSIS — I10 ESSENTIAL (PRIMARY) HYPERTENSION: ICD-10-CM

## 2023-09-14 DIAGNOSIS — Z82.3 FAMILY HISTORY OF STROKE: ICD-10-CM

## 2023-09-14 PROCEDURE — 99204 OFFICE O/P NEW MOD 45 MIN: CPT

## 2023-09-14 PROCEDURE — 93000 ELECTROCARDIOGRAM COMPLETE: CPT

## 2023-12-06 RX ORDER — ATORVASTATIN CALCIUM 40 MG/1
40 TABLET, FILM COATED ORAL
Refills: 0 | Status: ACTIVE | COMMUNITY

## 2023-12-06 RX ORDER — METOPROLOL TARTRATE 75 MG/1
75 TABLET, FILM COATED ORAL TWICE DAILY
Refills: 0 | Status: ACTIVE | COMMUNITY

## 2023-12-06 RX ORDER — SACUBITRIL AND VALSARTAN 24; 26 MG/1; MG/1
24-26 TABLET, FILM COATED ORAL TWICE DAILY
Refills: 0 | Status: ACTIVE | COMMUNITY

## 2023-12-06 RX ORDER — EMPAGLIFLOZIN 25 MG/1
TABLET, FILM COATED ORAL DAILY
Refills: 0 | Status: ACTIVE | COMMUNITY

## 2023-12-06 RX ORDER — ASPIRIN 81 MG
81 TABLET, DELAYED RELEASE (ENTERIC COATED) ORAL DAILY
Refills: 0 | Status: ACTIVE | COMMUNITY

## 2023-12-08 PROBLEM — I42.8 NONISCHEMIC CARDIOMYOPATHY: Status: ACTIVE | Noted: 2023-12-08

## 2023-12-08 PROBLEM — Z87.19 HISTORY OF IRRITABLE BOWEL SYNDROME: Status: RESOLVED | Noted: 2023-12-08 | Resolved: 2023-12-08

## 2023-12-08 PROBLEM — I10 HYPERTENSION, UNSPECIFIED TYPE: Status: ACTIVE | Noted: 2023-12-08

## 2023-12-08 PROBLEM — I50.20 HFREF (HEART FAILURE WITH REDUCED EJECTION FRACTION): Status: ACTIVE | Noted: 2023-12-08

## 2023-12-08 PROBLEM — Z78.9 DOES NOT USE TOBACCO: Status: ACTIVE | Noted: 2023-12-08

## 2023-12-08 PROBLEM — I50.22 CHRONIC SYSTOLIC HEART FAILURE: Status: ACTIVE | Noted: 2023-12-08

## 2023-12-08 PROBLEM — Z82.3 FAMILY HISTORY OF CEREBROVASCULAR ACCIDENT (CVA): Status: ACTIVE | Noted: 2023-12-08

## 2024-08-05 ENCOUNTER — APPOINTMENT (OUTPATIENT)
Dept: ORTHOPEDIC SURGERY | Facility: CLINIC | Age: 84
End: 2024-08-05

## 2024-08-05 PROCEDURE — 99203 OFFICE O/P NEW LOW 30 MIN: CPT | Mod: 25

## 2024-08-05 PROCEDURE — 20610 DRAIN/INJ JOINT/BURSA W/O US: CPT | Mod: RT

## 2024-08-06 PROBLEM — M75.41 IMPINGEMENT SYNDROME OF RIGHT SHOULDER: Status: ACTIVE | Noted: 2024-08-06

## 2024-08-06 NOTE — ASSESSMENT
[FreeTextEntry1] : The patient is a 84-year-old female chief complaint of right shoulder pain.  She has had pain in the right shoulder for several months.  She is right-hand dominant.  There was no injury or overuse.  She has pain mostly overhead and gets occasional pain at night.  She feels weak when she tries to lift things overhead.  She has had no treatment with shots or physical therapy.  Examination of the right upper extremity reveals normal neurovascular exam.  Examination of the right shoulder feels full active and passive range of motion with 5 out of 5 strength of deltoid and rotator cuff.  There is markedly positive Neer and Robbins impingement signs.  There is no pain or deformity over the AC joint.  There is no instability or apprehension.  There is no redness, rashes or visible scars.  X-rays from [facility] in June 2024 show some impingement changes along the greater tuberosity and AC joint arthritis.  There is no glenohumeral arthritis.  There is no obvious tumors, mass or calcifications seen.  Under sterile conditions the right shoulder was injected in the subacromial space with 5 cc quarter percent plain Marcaine 5 cc of 2% plain lidocaine and 40 mg of Kenalog.  Patient tolerated the procedure well.  Plan at this time is ice and activity modification.  I will see her back in the office as necessary.  We can consider physical therapy or another injection in the future if necessary.

## 2024-08-06 NOTE — HISTORY OF PRESENT ILLNESS
[Radiating] : radiating [Shooting] : shooting [] : yes [Intermittent] : intermittent [Meds] : meds [Massage] : massage [FreeTextEntry5] : 83 y/o F presents for NP eval of the Rt. shldr today. Pt reports of chronic shldr pain with no associated trauma. Tylenol as needed. Moderate ROM. [FreeTextEntry7] : neck [FreeTextEntry9] : Topical creams

## 2024-08-06 NOTE — ASSESSMENT
[FreeTextEntry1] : The patient is a 84-year-old female chief complaint of right shoulder pain.  She has had pain in the right shoulder for several months.  She is right-hand dominant.  There was no injury or overuse.  She has pain mostly overhead and gets occasional pain at night.  She feels weak when she tries to lift things overhead.  She has had no treatment with shots or physical therapy.  Examination of the right upper extremity reveals normal neurovascular exam.  Examination of the right shoulder feels full active and passive range of motion with 5 out of 5 strength of deltoid and rotator cuff.  There is markedly positive Neer and Robbins impingement signs.  There is no pain or deformity over the AC joint.  There is no instability or apprehension.  There is no redness, rashes or visible scars.  X[facility] from [facility] in June 2024 show some impingement changes along the greater tuberosity and AC joint arthritis.  There is no glenohumeral arthritis.  There is no obvious tumors, mass or calcifications seen.  Under sterile conditions the right shoulder was injected in the subacromial space with 5 cc quarter percent plain Marcaine 5 cc of 2% plain lidocaine and 40 mg of Kenalog.  Patient tolerated the procedure well.  Plan at this time is ice and activity modification.  I will see her back in the office as necessary.  We can consider physical therapy or another injection in the future if necessary.

## 2025-06-03 ENCOUNTER — APPOINTMENT (OUTPATIENT)
Dept: ELECTROPHYSIOLOGY | Facility: CLINIC | Age: 85
End: 2025-06-03

## 2025-09-04 ENCOUNTER — NON-APPOINTMENT (OUTPATIENT)
Age: 85
End: 2025-09-04

## 2025-09-04 ENCOUNTER — APPOINTMENT (OUTPATIENT)
Dept: ELECTROPHYSIOLOGY | Facility: CLINIC | Age: 85
End: 2025-09-04

## 2025-09-04 VITALS
RESPIRATION RATE: 16 BRPM | HEART RATE: 59 BPM | HEIGHT: 62 IN | SYSTOLIC BLOOD PRESSURE: 149 MMHG | DIASTOLIC BLOOD PRESSURE: 61 MMHG | OXYGEN SATURATION: 100 %

## 2025-09-04 DIAGNOSIS — I49.3 VENTRICULAR PREMATURE DEPOLARIZATION: ICD-10-CM

## 2025-09-04 DIAGNOSIS — I50.20 UNSPECIFIED SYSTOLIC (CONGESTIVE) HEART FAILURE: ICD-10-CM

## 2025-09-04 DIAGNOSIS — I50.22 CHRONIC SYSTOLIC (CONGESTIVE) HEART FAILURE: ICD-10-CM

## 2025-09-04 DIAGNOSIS — I42.8 OTHER CARDIOMYOPATHIES: ICD-10-CM

## 2025-09-04 RX ORDER — METOPROLOL TARTRATE 50 MG/1
50 TABLET ORAL
Refills: 0 | Status: ACTIVE | COMMUNITY

## 2025-09-04 RX ORDER — AMIODARONE HYDROCHLORIDE 200 MG/1
200 TABLET ORAL TWICE DAILY
Qty: 180 | Refills: 1 | Status: ACTIVE | COMMUNITY
Start: 2025-09-04 | End: 1900-01-01

## 2025-09-18 ENCOUNTER — APPOINTMENT (OUTPATIENT)
Dept: ELECTROPHYSIOLOGY | Facility: CLINIC | Age: 85
End: 2025-09-18

## 2025-09-18 VITALS
BODY MASS INDEX: 21.71 KG/M2 | DIASTOLIC BLOOD PRESSURE: 69 MMHG | WEIGHT: 118 LBS | HEIGHT: 62 IN | SYSTOLIC BLOOD PRESSURE: 163 MMHG | HEART RATE: 51 BPM | OXYGEN SATURATION: 100 %